# Patient Record
Sex: MALE | URBAN - METROPOLITAN AREA
[De-identification: names, ages, dates, MRNs, and addresses within clinical notes are randomized per-mention and may not be internally consistent; named-entity substitution may affect disease eponyms.]

---

## 2018-09-26 ENCOUNTER — ED HISTORICAL/CONVERTED ENCOUNTER (OUTPATIENT)
Dept: OTHER | Age: 48
End: 2018-09-26

## 2018-09-30 ENCOUNTER — HOSPITAL ENCOUNTER (INPATIENT)
Age: 48
LOS: 1 days | Discharge: HOME | DRG: 864 | End: 2018-10-01
Attending: EMERGENCY MEDICINE | Admitting: HOSPITALIST
Payer: COMMERCIAL

## 2018-09-30 DIAGNOSIS — A01.00 TYPHOID FEVER: Primary | ICD-10-CM

## 2018-09-30 PROBLEM — R50.9 FEVER: Status: ACTIVE | Noted: 2018-09-30

## 2018-09-30 LAB
ALBUMIN SERPL-MCNC: 3.2 G/DL (ref 3.5–5)
ALBUMIN/GLOB SERPL: 0.9 {RATIO} (ref 1.1–2.2)
ALP SERPL-CCNC: 83 U/L (ref 45–117)
ALT SERPL-CCNC: 29 U/L (ref 12–78)
ANION GAP SERPL CALC-SCNC: 9 MMOL/L (ref 5–15)
AST SERPL-CCNC: 26 U/L (ref 15–37)
BASOPHILS # BLD: 0 K/UL (ref 0–0.1)
BASOPHILS NFR BLD: 0 % (ref 0–1)
BILIRUB SERPL-MCNC: 0.6 MG/DL (ref 0.2–1)
BUN SERPL-MCNC: 13 MG/DL (ref 6–20)
BUN/CREAT SERPL: 13 (ref 12–20)
CALCIUM SERPL-MCNC: 8.5 MG/DL (ref 8.5–10.1)
CHLORIDE SERPL-SCNC: 104 MMOL/L (ref 97–108)
CO2 SERPL-SCNC: 27 MMOL/L (ref 21–32)
COMMENT, HOLDF: NORMAL
CREAT SERPL-MCNC: 1.01 MG/DL (ref 0.7–1.3)
CRP SERPL-MCNC: 2.89 MG/DL (ref 0–0.6)
DIFFERENTIAL METHOD BLD: ABNORMAL
EOSINOPHIL # BLD: 0.2 K/UL (ref 0–0.4)
EOSINOPHIL NFR BLD: 3 % (ref 0–7)
ERYTHROCYTE [DISTWIDTH] IN BLOOD BY AUTOMATED COUNT: 13.2 % (ref 11.5–14.5)
GLOBULIN SER CALC-MCNC: 3.5 G/DL (ref 2–4)
GLUCOSE SERPL-MCNC: 122 MG/DL (ref 65–100)
HCT VFR BLD AUTO: 39.9 % (ref 36.6–50.3)
HGB BLD-MCNC: 13.5 G/DL (ref 12.1–17)
IMM GRANULOCYTES # BLD: 0 K/UL (ref 0–0.04)
IMM GRANULOCYTES NFR BLD AUTO: 1 % (ref 0–0.5)
LACTATE SERPL-SCNC: 1.6 MMOL/L (ref 0.4–2)
LDH SERPL L TO P-CCNC: 221 U/L (ref 85–241)
LYMPHOCYTES # BLD: 1.1 K/UL (ref 0.8–3.5)
LYMPHOCYTES NFR BLD: 14 % (ref 12–49)
MCH RBC QN AUTO: 28.8 PG (ref 26–34)
MCHC RBC AUTO-ENTMCNC: 33.8 G/DL (ref 30–36.5)
MCV RBC AUTO: 85.1 FL (ref 80–99)
MONOCYTES # BLD: 1.1 K/UL (ref 0–1)
MONOCYTES NFR BLD: 15 % (ref 5–13)
NEUTS SEG # BLD: 5.2 K/UL (ref 1.8–8)
NEUTS SEG NFR BLD: 68 % (ref 32–75)
NRBC # BLD: 0 K/UL (ref 0–0.01)
NRBC BLD-RTO: 0 PER 100 WBC
PLATELET # BLD AUTO: 150 K/UL (ref 150–400)
PMV BLD AUTO: 10 FL (ref 8.9–12.9)
POTASSIUM SERPL-SCNC: 4.3 MMOL/L (ref 3.5–5.1)
PROT SERPL-MCNC: 6.7 G/DL (ref 6.4–8.2)
RBC # BLD AUTO: 4.69 M/UL (ref 4.1–5.7)
SAMPLES BEING HELD,HOLD: NORMAL
SODIUM SERPL-SCNC: 140 MMOL/L (ref 136–145)
WBC # BLD AUTO: 7.7 K/UL (ref 4.1–11.1)

## 2018-09-30 PROCEDURE — 74011250637 HC RX REV CODE- 250/637: Performed by: HOSPITALIST

## 2018-09-30 PROCEDURE — 80053 COMPREHEN METABOLIC PANEL: CPT | Performed by: EMERGENCY MEDICINE

## 2018-09-30 PROCEDURE — 74011000258 HC RX REV CODE- 258: Performed by: HOSPITALIST

## 2018-09-30 PROCEDURE — 65270000029 HC RM PRIVATE

## 2018-09-30 PROCEDURE — 99283 EMERGENCY DEPT VISIT LOW MDM: CPT

## 2018-09-30 PROCEDURE — 85025 COMPLETE CBC W/AUTO DIFF WBC: CPT | Performed by: EMERGENCY MEDICINE

## 2018-09-30 PROCEDURE — 87040 BLOOD CULTURE FOR BACTERIA: CPT | Performed by: EMERGENCY MEDICINE

## 2018-09-30 PROCEDURE — 83615 LACTATE (LD) (LDH) ENZYME: CPT | Performed by: HOSPITALIST

## 2018-09-30 PROCEDURE — 74011250636 HC RX REV CODE- 250/636: Performed by: HOSPITALIST

## 2018-09-30 PROCEDURE — 83605 ASSAY OF LACTIC ACID: CPT | Performed by: EMERGENCY MEDICINE

## 2018-09-30 PROCEDURE — 36415 COLL VENOUS BLD VENIPUNCTURE: CPT | Performed by: EMERGENCY MEDICINE

## 2018-09-30 PROCEDURE — 86140 C-REACTIVE PROTEIN: CPT | Performed by: HOSPITALIST

## 2018-09-30 RX ORDER — SODIUM CHLORIDE 9 MG/ML
50 INJECTION, SOLUTION INTRAVENOUS CONTINUOUS
Status: DISCONTINUED | OUTPATIENT
Start: 2018-09-30 | End: 2018-10-01 | Stop reason: HOSPADM

## 2018-09-30 RX ORDER — SODIUM CHLORIDE 0.9 % (FLUSH) 0.9 %
5-10 SYRINGE (ML) INJECTION EVERY 8 HOURS
Status: DISCONTINUED | OUTPATIENT
Start: 2018-09-30 | End: 2018-10-01 | Stop reason: HOSPADM

## 2018-09-30 RX ORDER — SODIUM CHLORIDE 0.9 % (FLUSH) 0.9 %
5-10 SYRINGE (ML) INJECTION AS NEEDED
Status: DISCONTINUED | OUTPATIENT
Start: 2018-09-30 | End: 2018-10-01 | Stop reason: HOSPADM

## 2018-09-30 RX ORDER — ACETAMINOPHEN 325 MG/1
650 TABLET ORAL
Status: DISCONTINUED | OUTPATIENT
Start: 2018-09-30 | End: 2018-10-01 | Stop reason: HOSPADM

## 2018-09-30 RX ORDER — ENOXAPARIN SODIUM 100 MG/ML
40 INJECTION SUBCUTANEOUS EVERY 24 HOURS
Status: DISCONTINUED | OUTPATIENT
Start: 2018-09-30 | End: 2018-10-01 | Stop reason: HOSPADM

## 2018-09-30 RX ADMIN — Medication 10 ML: at 18:41

## 2018-09-30 RX ADMIN — SODIUM CHLORIDE 75 ML/HR: 900 INJECTION, SOLUTION INTRAVENOUS at 18:40

## 2018-09-30 RX ADMIN — MEROPENEM 500 MG: 500 INJECTION, POWDER, FOR SOLUTION INTRAVENOUS at 18:40

## 2018-09-30 RX ADMIN — ACETAMINOPHEN 650 MG: 325 TABLET ORAL at 17:56

## 2018-09-30 RX ADMIN — MEROPENEM 500 MG: 500 INJECTION, POWDER, FOR SOLUTION INTRAVENOUS at 23:16

## 2018-09-30 NOTE — H&P
[address]  HISTORY AND PHYSICAL Ashleigh Mata 
MR#: [id number] : 1970 ACCOUNT #: [de-identified] ADMIT DATE: 2018 PRIMARY CARE PHYSICIAN:  Dr. Bk Nguyen. INFECTIOUS DISEASE DOCTOR:  Dr. Henry Dejesus.   
 
PRESENTING COMPLAINT:  Abdominal pain and fever. 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 The patient initially thought that this is due to hypoglycemia, as once he eats his symptoms get better. However, his glycohemoglobin is around 6.1. He went to [place] for some volunteer work in 2018. When he came back his symptoms persisted. The patient initially thought that he had a urinary tract infection so he took Cipro. However, his symptoms did not resolve. Dr. Michelle Reed then had CT angiogram to rule out coronary artery disease because he was having some chest pains as well. According to the patient, his CTA was unremarkable, except it showed some hilar lymphadenopathy this was done before he visited [place]. Patient was having  feeling of weakness and fatigue, so he was seen at [facility] on Wednesday where a CT scan of the chest was done which showed mediastinal and hilar adenopathy with small right supraclavicular and paratracheal lymphadenopathy. Patient's imaging was reviewed by a pulmonologist and radiologist.   Patient's blood work done by his primary care physician showed positive antibody to typhoid. It is not clear if this is IgM or IgG. His blood cultures Per Dr Henry Dejesus , however, were negative which were drawn on Wednesday. Dr. Henry Dejesus gave him 1 dose of Rocephin on last Wednesday and discharged him on Cipro; however, since he visited [place] it was thought that he should be started on Suprax instead of Cipro due to resistance, which was not covered by his insurance agency. Patient took Cipro 750 b.i.d. on Wednesday, Thursday, Friday. Yesterday, he started taking Suprax. However, his abdominal pain and low-grade fever has not resolved, so he decided to come to the emergency room. Patient denies having any other complaints at this time. His abdominal pain is mild and is mainly in the right lower quadrant. A C-reactive protein which was done on Wednesday was 21.4. He denies any vomiting, sweating or rash. He has no cough or significant shortness of breath. PAST MEDICAL HISTORY:  Not significant. SOCIAL HISTORY:  The patient does not smoke. He does not drink. He is . He is an interventional cardiologist. 
 
FAMILY HISTORY:  Significant for multiple family members having heart problems. Coronary artery disease runs in brother, mother, father. Diabetes also runs in the family. Mother had lung and breast cancer. CODE STATUS:  FULL CODE. CURRENT MEDICATIONS:  At this time include Suprax 400 mg p.o. b.i.d., which he started yesterday. Prior to that, he received Cipro and 1 dose of Rocephin. REVIEW OF SYSTEMS:  Negative except as mentioned in history of presenting illness. All other systems were reviewed and no other positive finding was noticed. PHYSICAL EXAMINATION: 
GENERAL:  The patient is a pleasant 78-year-old gentleman, not in any acute distress. VITAL SIGNS:  Reveal a temperature of 100, blood pressure 126/78, pulse is 99, respiratory rate 16, sats 95%. HEENT:  Reveals pupils equally reacting to light and accommodation. NECK:  Supple. There is no lymphadenopathy or JVD. CHEST:  Clear. No wheezing, no crackles. HEART:  S1 and S2 regular. No murmur, no S3. 
ABDOMEN:  Unremarkable. There is mild tenderness in the right lower quadrant with no guarding or rigidity. Bowel sounds are active. EXTREMITIES:  No pedal edema. CENTRAL NERVOUS SYSTEM:  Unremarkable. The patient is awake, alert, oriented. There is normal strength, normal reflexes. Plantars downgoing.   Cranial nerves are normal. 
PSYCHIATRIC: Unremarkable. SKIN:  Does not show any rashes. LABORATORY DATA:  Which was done on the 26th of this month revealed a white count of 6.67, hemoglobin of 13.9, hematocrit 40.4, MCV of 82.3, platelet count was 390,187. His labs on the 26th showed electrolytes:  Sodium 137, potassium 3.9, chloride 101, bicarbonate is 29, BUN 17, creatinine 0.85, glucose was 161, calcium 9.1, albumin 2.9, ALT 21, AST 29, bilirubin 0.9, calcium 9.2. Lactic acid 2.3. Procalcitonin was less than 0.05. CRP high sensitivity was 29.4. Blood cultures were unremarkable. His glycohemoglobin was 6.5. Sent out test for typhoid, showed antibodies to salmonella typhi H type D and O type D. Cross reactivity with other salmonella species cannot be excluded. His PSA was 0.5. Brucella was negative, both IgG and IgM. Todays lab shows 4.1-11.1 (K/uL) 7.7     
  NRBC 0 ( WBC) 0.0    
  RBC RBC 4.10-5.70 (M/uL) 4.69    
  HEMOGLOBIN 12.1-17.0 (g/dL) 13.5    
  HEMATOCRIT 36.6-50.3 (%) 39.9    
  MCV MCV 80.0-99.0 (FL) 85.1    
  MCH MCH 26.0-34.0 (PG) 28.8    
  MCHC MCHC 30.0-36.5 (g/dL) 33.8    
  RDW RDW 11.5-14.5 (%) 13.2    
  PLATELET COUNT 992-127 (K/uL) 150    
  MPV 8.9-12.9 (FL) 10.0    
  SEGS 32-75 (%) 68    
  LYMPHS 12-49 (%) 14    
  MONOS 5-13 (%) 15 (High)    
  EOSINS 0-7 (%) 3    
  BASOS 0-1 (%) 0    
  IMMATURE GRANULOCYTES IG 0.0-0.5 (%) 1 (High)    
  DF  ( ) AUTOMATED    
  ABSOLUTE NRBC 0.00-0.01 (K/uL) 0.00    
  ABSOLUTE NEUTROPHILS ABG 1.8-8.0 (K/UL) 5.2    
  ABS. IMM. GRANS. AIG 0.00-0.04 (K/UL) 0.0    
  ABSOLUTE LYMPHS ABL 0.8-3.5 (K/UL) 1.1    
  ABSOLUTE MONOS ABM 0.0-1.0 (K/UL) 1.1 (High)    
  ABSOLUTE EOSINS RIGOBERTO 0.0-0.4 (K/UL) 0.2    
  ABSOLUTE BASOS ABB 0.0-0.1 (K/UL) 0.0 Results for Red Frias (MRN [id number]) as of 9/30/2018 18:52 Ref. Range 9/30/2018 16:09 Sodium Latest Ref Range: 136 - 145 mmol/L 140 Potassium Latest Ref Range: 3.5 - 5.1 mmol/L 4.3 Chloride Latest Ref Range: 97 - 108 mmol/L 104 CO2 Latest Ref Range: 21 - 32 mmol/L 27 Anion gap Latest Ref Range: 5 - 15 mmol/L 9 Glucose Latest Ref Range: 65 - 100 mg/dL 122 (H) BUN Latest Ref Range: 6 - 20 MG/DL 13 Creatinine Latest Ref Range: 0.70 - 1.30 MG/DL 1.01  
BUN/Creatinine ratio Latest Ref Range: 12 - 20   13 Calcium Latest Ref Range: 8.5 - 10.1 MG/DL 8.5  
GFR est non-AA Latest Ref Range: >60 ml/min/1.73m2 >60  
GFR est AA Latest Ref Range: >60 ml/min/1.73m2 >60 Bilirubin, total Latest Ref Range: 0.2 - 1.0 MG/DL 0.6 Protein, total Latest Ref Range: 6.4 - 8.2 g/dL 6.7 Albumin Latest Ref Range: 3.5 - 5.0 g/dL 3.2 (L) Globulin Latest Ref Range: 2.0 - 4.0 g/dL 3.5 A-G Ratio Latest Ref Range: 1.1 - 2.2   0.9 (L) ALT (SGPT) Latest Ref Range: 12 - 78 U/L 29 AST Latest Ref Range: 15 - 37 U/L 26 Alk. phosphatase Latest Ref Range: 45 - 117 U/L 83 Patient also had a recent echo done at his own office which was negative for endocarditis. Imaging done on 26th at [facility] included a CT scan of the chest, abdomen and pelvis. It showed bilateral mediastinal hilar, subcarinal paraesophageal lymphadenopathy. The largest lymph node is subcarinal 2.3 x 1.4 cm. The largest right hilar lymph node is 2.5 x 1 cm. A superior right paratracheal/supraclavicular lymph node 1.6 x 1 cm was also seen. No destructive blastic or lytic lesions were seen. CT abdomen was also done which showed a fatty liver, hepatic calcification and no stones, but it did show upper abdominal lymphadenopathy around celiac axis. The largest lymph node is 2.7 x 1.5 cm with precaval lymph node enlargement as well, 1.4 x 2.2 cm and 2.0 x 1.6 cm. No ascites or free air was seen. ASSESSMENT AND PLAN:  T 
 
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   It is of note that the patient returned from [place] in  the first week of August.  However, even before going to [facility], he was having these symptoms of weakness, dizziness and lightheadedness. 1.  Rule out lymphoma night sweats with diffuse LN enlargement is suggestive of Lymphoma. Patient  will need  lymph node biopsy. We will consult pulmonology. He will get CD of CT scan done at [facility] on 26th which will be reviewed by IR tomorrow. 2.  Positive antibodies for salmonella. It is not clear if this is IgM or IgG. However, since the patient has returned recently from [facility] will treat him empirically with meropenem as per ID guidelines,  there is high prevalence of resistance to typhoid in [place]. We will start him on meropenem. I  will ask ID to see the patient for further evaluation. I have ordered 2 large volume of blood cultures as mentioned above. I spoke with Dr. Paola Reyes who had seen him at [facility]. She tells me that the blood cultures are negative. Stool cultures for Typhoid are ordered as well. Over all clinical picture is not very suggestive for Typhoid. 3.  Strong history of coronary artery disease. However, his chest pain is atypical and he had negative workup including an echo and CT angiogram. 
4.  Further workup depending on evaluation. 5.  DVT prophylaxis. 6. Quantiferon ordered as he had previous history of BCG related positive PPD. MD RASHAWN Matute/KEISHA 
D: 09/30/2018 16:36    
T: 09/30/2018 18:29 
JOB #: [id number]

## 2018-09-30 NOTE — ED NOTES
TRANSFER - OUT REPORT:    Verbal report given to KASSY Blank (name) on Chrystal Subramanian  being transferred to NSTU (unit) for routine progression of care       Report consisted of patients Situation, Background, Assessment and   Recommendations(SBAR). Information from the following report(s) SBAR and ED Summary was reviewed with the receiving nurse. Lines:   Peripheral IV 09/30/18 Right Forearm (Active)   Site Assessment Clean, dry, & intact 9/30/2018  4:32 PM   Phlebitis Assessment 0 9/30/2018  4:32 PM   Infiltration Assessment 0 9/30/2018  4:32 PM   Dressing Status Clean, dry, & intact 9/30/2018  4:32 PM        Opportunity for questions and clarification was provided.       Patient transported with:   [facility]

## 2018-09-30 NOTE — PROGRESS NOTES
Day #1 of meropenum  Indication:  h/o typhoid fever, bloodstream infection  Current regimen:  1gmq8h  Abx regimen:    Recent Labs      18   1609   WBC  7.7   CREA  1.01   BUN  13     Est CrCl: 93 ml/min  Temp (24hrs), Av.4 °F (37.4 °C), Min:98.8 °F (37.1 °C), Max:100 °F (37.8 °C)    Cultures: blood - pending    Plan: Change to 500mg q6h

## 2018-09-30 NOTE — PROGRESS NOTES
Primary Nurse Emerson Pinto and TOÑO Chapman RN performed a dual skin assessment on this patient No impairment noted  Jhony score is 23

## 2018-09-30 NOTE — ED TRIAGE NOTES
Pt reports he was diagnosed with Typhoid Fever 4 weeks ago and was prescribed antibiotics. Pt continued to feel sick with fever and was seen at [facility] 2 weeks ago and given different antibiotic. Pt states he had traveled to [facility] 6 weeks ago and has been sick since. Temp in triage 100 oral.  Pt took Tylenol 500 mg PO at 0800 this morning.

## 2018-09-30 NOTE — ED PROVIDER NOTES
HPI Comments: 46 y.o. male with no known past medical history who presents from home via a private vehicle with chief complaint of fevers. Pt reports he traveled to [place] and returned six weeks ago with fevers and chills. Pt reports he was diagnosed with Typhoid fever at the time and was prescribed oral Cipro. However, pt states his providers found that the particular strain of typhoid is resistant to Cipro and they changed his antibiotics to Suprax. However, pt reports his fevers never completely resolved and was told he required a PICC line for 10-14 days to receive Ceftriaxone. Pt reports he had a fever of 100.2 F about two hours ago. Pt states he has been trying Ibuprofen 200 mg at home for temporary mild relief. Pt also complains of some mild right lower quadrant abdominal pain. Pt notes he also has some chronic lower extremity edema due to his history of venous insufficiency. Pt states he had an echocardiogram done two days ago which did not show any mass or endocarditis. Pt denies any rash. There are no other acute medical concerns at this time. Note written by Theresa Turner, as dictated by Julián Padilla MD 3:55 PM.        The history is provided by the patient. No  was used. No past medical history on file. No past surgical history on file. No family history on file. Social History     Social History    Marital status:      Spouse name: N/A    Number of children: N/A    Years of education: N/A     Occupational History    Not on file. Social History Main Topics    Smoking status: Not on file    Smokeless tobacco: Not on file    Alcohol use Not on file    Drug use: Not on file    Sexual activity: Not on file     Other Topics Concern    Not on file     Social History Narrative         ALLERGIES: Review of patient's allergies indicates not on file. Review of Systems   Constitutional: Positive for chills and fever.    HENT: Negative for ear pain and sore throat. Eyes: Negative for pain. Respiratory: Negative for chest tightness and shortness of breath. Cardiovascular: Positive for leg swelling (Chronic). Negative for chest pain. Gastrointestinal: Positive for abdominal pain. Negative for nausea and vomiting. Genitourinary: Negative for dysuria and flank pain. Musculoskeletal: Negative for back pain. Skin: Negative for rash. Neurological: Negative for headaches. All other systems reviewed and are negative. Vitals:    09/30/18 1518   BP: 126/78   Pulse: 99   Resp: 16   Temp: 100 °F (37.8 °C)   SpO2: 95%   Weight: 83.9 kg (185 lb)   Height: 5' 11\" (1.803 m)            Physical Exam   Constitutional: He is oriented to person, place, and time. He appears well-developed and well-nourished. No distress. HENT:   Head: Normocephalic and atraumatic. Eyes: Conjunctivae are normal.   Neck: Neck supple. No tracheal deviation present. Cardiovascular: Normal rate, regular rhythm, normal heart sounds and intact distal pulses. Pulmonary/Chest: Effort normal and breath sounds normal. No respiratory distress. Abdominal: He exhibits no distension. There is no tenderness. Musculoskeletal: Normal range of motion. He exhibits edema. 1+ bilateral lower extremity edema. Neurological: He is alert and oriented to person, place, and time. Skin: Skin is warm and dry. Psychiatric: He has a normal mood and affect. Nursing note and vitals reviewed. Note written by Theresa Alonso, as dictated by Emile Turner MD 4:01 PM.    MDM    46 y.o. male presents with known typhoid infection. Referred here for IV ABx and PICC placement. Hospitalist at bedside to admit patient for inpatient management. Stable throughout ED course. ED Course       Procedures    CONSULT NOTE:  4:00 PM Emile Turner MD spoke with Dr. Chaya Mejia, Consult for Hospitalist.  Discussed available diagnostic tests and clinical findings. Dr. Antonia Garcia will see and admit.    4:00 PM  Patient is being admitted to the hospital.  The results of their tests and reasons for their admission have been discussed with them and/or available family. They convey agreement and understanding for the need to be admitted and for their admission diagnosis. Consultation will be made now with the inpatient physician for hospitalization.

## 2018-09-30 NOTE — IP AVS SNAPSHOT
[facility] [facility] 
[phone] Patient: Suzanne Mack MRN: [id number] :1970 You are allergic to the following Allergen Reactions Sulfa (Sulfonamide Antibiotics) Rash Recent Documentation Height Weight BMI  
  
  
 1.803 m 83.9 kg 25.8 kg/m2 Unresulted Labs-Please follow up with your PCP about these lab tests Order Current Status COXIELLA ABS, IGG In process CULTURE, STOOL In process FUNGAL ABS, QT In process HISTOPLASMA AG, UR In process HIV 1/2 AG/AB, 4TH GENERATION,W RFLX CONFIRM In process PARASITE EXAM, BLOOD In process QUANTIFERON-TB PLUS(CLIENT INCUB.) In process CULTURE, BLOOD, PAIRED Preliminary result Emergency Contacts  (Rel.) Home Phone Work Phone Mobile Phone Julissa Mode (Spouse) -- -- [phone] About your hospitalization You were admitted on:  2018 You last received care in the:  [facility] You were discharged on:  2018 Why you were hospitalized Your primary diagnosis was:  Fever Providers Seen During Your Hospitalization Provider Specialty Primary office phone Mike Santizo MD Emergency Medicine [phone] Cris Rodgers MD Internal Medicine [phone] [address] [facility] MD Darshana Internal Medicine [phone] Your Primary Care Physician (PCP) Primary Care Physician Office Phone Office Fax OTHER, PHYS ** None ** ** None ** Follow-up Information Follow up With Details Comments Contact Info Mariya Garcia MD   [address] [facility] Internal Medicine [facility] [address] 
[phone] Aaron Hernandez MD   Patient can only remember the practice name and not the physician My Medications Notice You have not been prescribed any medications. Discharge Instructions Discharge Instructions PATIENT ID: [employer] Marilynn MRN: [id number] YOB: 1970 DATE OF ADMISSION: 9/30/2018  3:39 PM   
DATE OF DISCHARGE: 10/1/2018 PRIMARY CARE PROVIDER: Hermelinda Katz MD  
 
ATTENDING PHYSICIAN: Francy Cunningham MD 
DISCHARGING PROVIDER: Francy Cunningham MD   
To contact this individual call [phone] and ask the  to page. If unavailable ask to be transferred the Adult Hospitalist Department. DISCHARGE DIAGNOSES Subacute febrile illness of undetermined etiology. After discussing with Infectious Disease it was determined you be monitored as out patient off antibiotics. Severeal serology studies were sent prior to your discharge. Follow with Dr Latisha Poole office for results. Keep record of your temperature and take it to your doctors' appointment. Do not take antipyretics unless you have recorded fever. CONSULTATIONS: IP CONSULT TO HOSPITALIST 
IP CONSULT TO INTERVENTIONAL RADIOLOGY 
IP CONSULT TO INFECTIOUS DISEASES 
IP CONSULT TO PULMONOLOGY PROCEDURES/SURGERIES: * No surgery found * PENDING TEST RESULTS:  
At the time of discharge the following test results are still pending: several serology studies are pending at discharge. FOLLOW UP APPOINTMENTS:  
Follow-up Information Follow up With Details Comments Contact Info Liv De Luna MD   [address] [place] [address] [address] [facility] Internal Medicine [address] [place] [address] 
[phone] ADDITIONAL CARE RECOMMENDATIONS:  
 
DIET: Regular Diet DISCHARGE MEDICATIONS: 
 See Medication Reconciliation Form · It is important that you take the medication exactly as they are prescribed. · Keep your medication in the bottles provided by the pharmacist and keep a list of the medication names, dosages, and times to be taken in your wallet. · Do not take other medications without consulting your doctor.   
 
 
NOTIFY YOUR PHYSICIAN FOR ANY OF THE FOLLOWING:  
Fever over 101 degrees for 24 hours. Chest pain, shortness of breath, fever, chills, nausea, vomiting, diarrhea, change in mentation, falling, weakness, bleeding. Severe pain or pain not relieved by medications. Or, any other signs or symptoms that you may have questions about. DISPOSITION: 
x  Home With: 
 OT  PT  [place]  RN  
  
 SNF/Inpatient Rehab/LTAC Independent/assisted living Hospice Other:  
 
 
 
Signed: Justo Cheung MD 
10/1/2018 
7:02 PM 
 
Discharge Orders None [employer] Announcement We are excited to announce that we are making your provider's discharge notes available to you in JoopLoop. You will see these notes when they are completed and signed by the physician that discharged you from your recent hospital stay. If you have any questions or concerns about any information you see in JoopLoop, please call the Health Information Department where you were seen or reach out to your Primary Care Provider for more information about your plan of care. Introducing [facility]! [facility] introduces JoopLoop patient portal. Now you can access parts of your medical record, email your doctor's office, and request medication refills online. 1. In your internet browser, go to [email or web address] 2. Click on the First Time User? Click Here link in the Sign In box. You will see the New Member Sign Up page. 3. Enter your JoopLoop Access Code exactly as it appears below. You will not need to use this code after youve completed the sign-up process. If you do not sign up before the expiration date, you must request a new code. · JoopLoop Access Code: [email or web address] Expires: 12/29/2018  3:13 PM 
 
4. Enter the last four digits of your Social Security Number (xxxx) and Date of Birth (mm/dd/yyyy) as indicated and click Submit. You will be taken to the next sign-up page. 5. Create a JoopLoop ID.  This will be your JoopLoop login ID and cannot be changed, so think of one that is secure and easy to remember. 6. Create a [employer] password. You can change your password at any time. 7. Enter your Password Reset Question and Answer. This can be used at a later time if you forget your password. 8. Enter your e-mail address. You will receive e-mail notification when new information is available in [address] 9. Click Sign Up. You can now view and download portions of your medical record. 10. Click the Download Summary menu link to download a portable copy of your medical information. If you have questions, please visit the Frequently Asked Questions section of the [employer]oris website. Remember, Adventoris is NOT to be used for urgent needs. For medical emergencies, dial 911. Now available from your iPhone and Android! General Information Please provide this summary of care documentation to your next provider. Patient Signature:  ____________________________________________________________ Date:  ____________________________________________________________  
  
[facility] Provider Signature:  ____________________________________________________________ Date:  ____________________________________________________________

## 2018-10-01 VITALS
HEART RATE: 80 BPM | DIASTOLIC BLOOD PRESSURE: 85 MMHG | RESPIRATION RATE: 16 BRPM | OXYGEN SATURATION: 97 % | TEMPERATURE: 99 F | HEIGHT: 71 IN | SYSTOLIC BLOOD PRESSURE: 137 MMHG | BODY MASS INDEX: 25.9 KG/M2 | WEIGHT: 185 LBS

## 2018-10-01 LAB
HIV 1+2 AB+HIV1 P24 AG SERPL QL IA: NONREACTIVE
HIV12 RESULT COMMENT, HHIVC: NORMAL
MALARIA SMEAR BLD: NEGATIVE

## 2018-10-01 PROCEDURE — 74011250636 HC RX REV CODE- 250/636: Performed by: HOSPITALIST

## 2018-10-01 PROCEDURE — 87207 SMEAR SPECIAL STAIN: CPT | Performed by: INTERNAL MEDICINE

## 2018-10-01 PROCEDURE — 36415 COLL VENOUS BLD VENIPUNCTURE: CPT | Performed by: INTERNAL MEDICINE

## 2018-10-01 PROCEDURE — 87385 HISTOPLASMA CAPSUL AG IA: CPT | Performed by: INTERNAL MEDICINE

## 2018-10-01 PROCEDURE — 86638 Q FEVER ANTIBODY: CPT | Performed by: INTERNAL MEDICINE

## 2018-10-01 PROCEDURE — 86480 TB TEST CELL IMMUN MEASURE: CPT | Performed by: HOSPITALIST

## 2018-10-01 PROCEDURE — 87389 HIV-1 AG W/HIV-1&-2 AB AG IA: CPT | Performed by: INTERNAL MEDICINE

## 2018-10-01 PROCEDURE — 86606 ASPERGILLUS ANTIBODY: CPT | Performed by: INTERNAL MEDICINE

## 2018-10-01 PROCEDURE — 74011000258 HC RX REV CODE- 258: Performed by: HOSPITALIST

## 2018-10-01 PROCEDURE — 87045 FECES CULTURE AEROBIC BACT: CPT | Performed by: HOSPITALIST

## 2018-10-01 RX ADMIN — SODIUM CHLORIDE 75 ML/HR: 900 INJECTION, SOLUTION INTRAVENOUS at 05:01

## 2018-10-01 RX ADMIN — Medication 10 ML: at 05:01

## 2018-10-01 RX ADMIN — MEROPENEM 500 MG: 500 INJECTION, POWDER, FOR SOLUTION INTRAVENOUS at 18:05

## 2018-10-01 RX ADMIN — MEROPENEM 500 MG: 500 INJECTION, POWDER, FOR SOLUTION INTRAVENOUS at 05:01

## 2018-10-01 RX ADMIN — MEROPENEM 500 MG: 500 INJECTION, POWDER, FOR SOLUTION INTRAVENOUS at 11:55

## 2018-10-01 RX ADMIN — ENOXAPARIN SODIUM 40 MG: 40 INJECTION SUBCUTANEOUS at 18:04

## 2018-10-01 NOTE — PROGRESS NOTES
1813: Patient is requesting to go with antibiotics and does not want to spend another night. Talked to Dr. Malone Romberg who has contacted ID with the discharge process.

## 2018-10-01 NOTE — CONSULTS
PULMONARY/CRITICAL CARE/SLEEP MEDICINE    Initial Physician Consultation Note    Name: Rafa Moreno   : 1970   MRN: [id number]   Date: 10/1/2018      Subjective:   Consult Note: 10/1/2018   Requesting Physician: Dr. Donell Raymond  Reason for consult: Abnormal chest imaging    Medical records and data reviewed. Patient is a 46 y.o. male who presented to the hospital for abd pain and fever. He was noted to have lab abnormalities suggestive of salmonella infection and is being treated for the same. He reports a history of fatigue and tiredness for the past year not associated with fever, night sweats of weight loss. 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 He has a history of positive PPD. Prior to leaving for [place] this summer, he had coronary CT that suggested mediastinal adenopathy. This was confirmed by a chest CT at the OSH. CT images are not with the patient and have been sent down to be scanned in PACS-- not available for review in PACS at this hour as well. He was recommended mediastinal LN biopsy by pulmonologist at the OSH and wants to consolidate care here. Mediastinal adenopathy apparently predates his travel to [place]    Review of Systems:     A comprehensive 12 system review of systems was negative except for as documented in HPI    Assessment:     Suspected Typhoid fever  Abnormal chest imaging with mediastinal LAD, possibly component of generalized LAD, no images to review personally at this hour  H/O positive PPD    Recommendations:    We will review chest imaging when it becomes available and explore EBUS option if lymph nodes are amenable to biopsy by that route  This can be done as an outpatient as well if discharge is planned-- probably preferable after acute illness has resolved  Dr. Khari Chaudhary to follow up with plans tomorrow  D/W patient    Active Problem List:     Problem List  Date Reviewed: 2018          Codes Class    * (Principal)Fever ICD-10-CM: R50.9  ICD-9-CM: 780.60               Past Medical History:      has no past medical history on file. Past Surgical History:      has no past surgical history on file. Home Medications:     Prior to Admission medications    Not on File       Allergies/Social/Family History: Allergies   Allergen Reactions    Sulfa (Sulfonamide Antibiotics) Rash      Social History   Substance Use Topics    Smoking status: Not on file    Smokeless tobacco: Not on file    Alcohol use Not on file      No family history on file. Objective:   Vital Signs:  Visit Vitals    /67 (BP 1 Location: Left arm, BP Patient Position: At rest)    Pulse 76    Temp 98.5 °F (36.9 °C)    Resp 16    Ht 5' 11\" (1.803 m)    Wt 83.9 kg (185 lb)    SpO2 98%    BMI 25.8 kg/m2      O2 Device: Room air Temp (24hrs), Av °F (37.2 °C), Min:98.5 °F (36.9 °C), Max:100 °F (37.8 °C)           Intake/Output:     Intake/Output Summary (Last 24 hours) at 10/01/18 1205  Last data filed at 10/01/18 1054   Gross per 24 hour   Intake             1465 ml   Output                0 ml   Net             1465 ml       Physical Exam:   General:  Alert, cooperative, no distress, appears stated age. Head:  Normocephalic, without obvious abnormality, atraumatic. Eyes:  Conjunctivae/corneas clear. PERRL   Neck: Supple, symmetrical, no adenopathy, no carotid bruit and no JVD. Lungs:   Clear to auscultation bilaterally. Chest wall:  No tenderness or deformity. Heart:  Regular rate and rhythm, S1-S2 normal, no murmur, no click, rub or gallop. Abdomen:   Soft, non-tender. Bowel sounds present. No masses,  No organomegaly. Extremities: Atraumatic, no cyanosis or edema.    Pulses: Palpable   Skin: No rashes or lesions   Neurologic: Grossly nonfocal         LABS AND  DATA: Personally reviewed  Recent Labs      18   1609   WBC  7.7   HGB  13.5   HCT  39.9   PLT  150     Recent Labs      18   1609   NA 140   K  4.3   CL  104   CO2  27   BUN  13   CREA  1.01   GLU  122*   CA  8.5     Recent Labs      09/30/18   1609   SGOT  26   AP  83   TP  6.7   ALB  3.2*   GLOB  3.5     No results for input(s): INR, PTP, APTT in the last 72 hours. No lab exists for component: INREXT   No results for input(s): PHI, PCO2I, PO2I, FIO2I in the last 72 hours. No results for input(s): CPK, CKMB, TROIQ, BNPP in the last 72 hours. MEDS: Reviewed    Chest Imaging: personally reviewed and report checked    Tele- reviewed    Medical decision making:   I have reviewed the flowsheet and previous day's notes  Review and order of Clinical lab tests        Thank you for allowing me to participate in this patient's care.     Shayy Briceno MD      [facility]

## 2018-10-01 NOTE — PROGRESS NOTES
CM received call from Andreina Murillo, [facility] / Juancarlos Sanchez regarding this patient. He has [facility] and [facility] [place] is not a participating provider. She states she has notified Dr. Rajinder Isidro of the patient's insurance status and recommendation for transfer to [facility]. CM is available to assist with transfer arrangements if indicated.   Claire Schlatter, [place], CRM

## 2018-10-01 NOTE — PROGRESS NOTES
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 The patient initially thought that this is due to hypoglycemia, as once he eats his symptoms get better. However, his glycohemoglobin is around 6.1. He went to [place] for some volunteer work in 2018. When he came back his symptoms persisted. The patient initially thought that he had a urinary tract infection so he took Cipro. However, his symptoms did not resolve. Dr. Jabier Givens then had CT angiogram to rule out coronary artery disease because he was having some chest pains as well. According to the patient, his CTA was unremarkable, except it showed some hilar lymphadenopathy this was done before he visited [place]. Patient was having  feeling of weakness and fatigue, so he was seen at [facility] on Wednesday where a CT scan of the chest was done which showed mediastinal and hilar adenopathy with small right supraclavicular and paratracheal lymphadenopathy. Patient's imaging was reviewed by a pulmonologist and radiologist.   Patient's blood work done by his primary care physician showed positive antibody to typhoid. It is not clear if this is IgM or IgG. His blood cultures Per Dr Abhishek Wu , however, were negative which were drawn on Wednesday.   Dr. Abhishek Wu gave him 1 dose of Rocephin on last Wednesday and discharged him on Cipro; however, since he visited [place] it was thought that he should be started on Suprax instead of Cipro due to resistance, which was not covered by his insurance agency. Patient took Cipro 750 b.i.d. on Wednesday, Thursday, Friday. Yesterday, he started taking Suprax. However, his abdominal pain and low-grade fever has not resolved, so he decided to come to the emergency room. Patient denies having any other complaints at this time. His abdominal pain is mild and is mainly in the right lower quadrant. A C-reactive protein which was done on Wednesday was 21.4. He denies any vomiting, sweating or rash. He has no cough or significant shortness of breath. 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 No fever or abdominal pain. I have talked with ID,and his PCP. Assessment & Plan:   Subacute febrile illness,unclear etiology. .  I understand typhoid fever diagnosis was entertained by his outpatient providers including infectious disease based on recent travel ,positive antibody. He has taken round os antibiotics including ciprofloxacin. He started Suprax two days ago. There was plan for picc placement for IV ceftriaxone. Chest,abdomena and pelvic CT reports indicated LAP,no image and report in our system. I was informed by patients brother in law, [facility] that  [facility] [place] at [id number] looked at the CT LN have a reactive appearance and recommended deferring biopsy. I discussed with Dr Dontrell Schwarz who is gathering data and additional information from his out patient providers to make recommendations. CRP slightly elevated,ESR normal.WBC essentially normal.Had peripheral smear which was negative for malaria. Patient reported he had two negative echocardiograms for IE. Quantiferon result pending.  -continue Merrem for now. No antipyretics unless fever is documented,communicated to patient and nurse. Generalized lymphadenopathy  -While this could be reactive of the febrile illness,he may need biopsy diagnosis to rule out ddx such as lymphoma,Tb and other inflammatory conditions. -pulmonary consulted.       Strong history of coronary artery disease. However, his chest pain is atypical and he had negative workup including an echo and CT angiogram.    Regular diet  Ambulate  Possible d/c in AM.    Hospital Problems  Date Reviewed: 9/30/2018          Codes Class Noted POA    * (Principal)Fever ICD-10-CM: R50.9  ICD-9-CM: 780.60  9/30/2018 Unknown                Review of Systems:   A comprehensive review of systems was negative except for that written in the HPI. Physical Examination:      Last 24hrs VS reviewed since prior progress note. Most recent are:  Visit Vitals    /85 (BP 1 Location: Left arm, BP Patient Position: At rest)    Pulse 80    Temp 99 °F (37.2 °C)    Resp 16    Ht 5' 11\" (1.803 m)    Wt 83.9 kg (185 lb)    SpO2 97%    BMI 25.8 kg/m2           Constitutional:  No acute distress, cooperative, pleasant    HEENT: Head is a traumatic,  Un icteric sclera. Pink conjunctiva,no erythema or discharge. Oral mucous moist, oropharynx benign. Neck supple,    Resp:  CTA bilaterally. No wheezing/rhonchi/rales. No accessory muscle use   CV:  Regular rhythm, normal rate, no murmurs, gallops, rubs    GI:  Soft, non distended, non tender. normoactive bowel sounds, no hepatosplenomegaly    :  No CVA or suprapubic tenderness   Skin  :  No erythema,rash,bullae,dipigmentation     Musculoskeletal:  No edema, warm, 2+ pulses throughout    Neurologic:  AAOx3, CN II-XII reviewed. Moves all extremities. Psych:  Good insight, Not anxious nor agitated.        Intake/Output Summary (Last 24 hours) at 10/01/18 1725  Last data filed at 10/01/18 1054   Gross per 24 hour   Intake             1465 ml   Output                0 ml   Net             1465 ml          Data Review:    Review and/or order of clinical lab test  Review and/or order of tests in the radiology section of CPT  Review and/or order of tests in the medicine section of CPT      Labs:     Recent Labs      09/30/18   1609   WBC  7.7   HGB  13.5   HCT  39.9   PLT  150     Recent Labs 09/30/18   1609   NA  140   K  4.3   CL  104   CO2  27   BUN  13   CREA  1.01   GLU  122*   CA  8.5     Recent Labs      09/30/18   1609   SGOT  26   ALT  29   AP  83   TBILI  0.6   TP  6.7   ALB  3.2*   GLOB  3.5     No results for input(s): INR, PTP, APTT in the last 72 hours. No lab exists for component: INREXT   No results for input(s): FE, TIBC, PSAT, FERR in the last 72 hours. No results found for: FOL, RBCF   No results for input(s): PH, PCO2, PO2 in the last 72 hours. No results for input(s): CPK, CKNDX, TROIQ in the last 72 hours.     No lab exists for component: CPKMB  No results found for: CHOL, CHOLX, CHLST, CHOLV, HDL, LDL, LDLC, DLDLP, TGLX, TRIGL, TRIGP, CHHD, CHHDX  No results found for: GLUCPOC  No results found for: COLOR, APPRN, SPGRU, REFSG, MARIBEL, PROTU, GLUCU, KETU, BILU, UROU, SOHA, LEUKU, GLUKE, EPSU, BACTU, WBCU, RBCU, CASTS, UCRY      Medications Reviewed:     Current Facility-Administered Medications   Medication Dose Route Frequency    sodium chloride (NS) flush 5-10 mL  5-10 mL IntraVENous Q8H    sodium chloride (NS) flush 5-10 mL  5-10 mL IntraVENous PRN    enoxaparin (LOVENOX) injection 40 mg  40 mg SubCUTAneous Q24H    meropenem (MERREM) 500 mg in 0.9% sodium chloride (MBP/ADV) 50 mL  500 mg IntraVENous Q6H    acetaminophen (TYLENOL) tablet 650 mg  650 mg Oral Q6H PRN    0.9% sodium chloride infusion  50 mL/hr IntraVENous CONTINUOUS     ______________________________________________________________________  EXPECTED LENGTH OF STAY: 2d 21h  ACTUAL LENGTH OF STAY:          1                 Kimberly Torres MD

## 2018-10-01 NOTE — CONSULTS
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 He does not take any travel prophylaxis  LAst December( )   he was planning to go to [facility] but had to return from the airport because of fever, cough and took zpak and felt better. In 2018 he started to feel tired and exhausted and was also getting vague chest pains- so he had a heart CT with angio in 2018 which ruled out coronary artery disease. It showed some small mediastinal, carinal, hilar lymph nodes. He went to [place] In 2018 for medical work to  [address] [place] [address] and saw patients upto 350 OP and also did surgeries. When he got back after 10 days he was tired and had low grade fevers < 100  In aug 2018 he had some dysuria and lower abdominal pain  and thought he had UTI and took cipro for 5 days 250mg BID. He then went to his PCP on  and he had labs sent including brucella, rickettsia, malaria, blood culture which were all negative. He sent typhoid antibodies which came back positive ( O and H)  On  patient had a long first case at work and had to cancel the rest of the patients due to exhaustion.  He had fever as high as 102 and went to [address] [place] [address] on  to the ED and had labs drawn and got another CT chest. Saw an ID physician who questioned typhoid and  gave him IV ceftriaxone 1 dose and sent him on cipro which was changed to suprax ( because of concern for resistance)  Pt took suprax for 3 days and on  as he did not look well his DANIEL a physician with [facility] brought him to the Ed and he was admitted- he has not had any fever since admission. Prior to admission he has been taking Ibuprofen and tylenol almost every day.   He never had diarrhea, cough, sob, rash, joint pain, weight loss, poor appetite  He never took chemo prophylaxis for malaria or had any vaccination when he visited [place]  He drank bottled water and did not eat any street food or raw food  He did not do any hiking, swimming, water sports, exposure to animals, insect bites, animal bites etc  A few months ago he had a broken took and had root canal rx  His mother has been sick and in the hospital for the past 4 months in [place] and he has been busy visiting her as well      PMH-none  PSH-none  SOCX- lives with wife and kids  Non smoker, no alcohol or illicit drugs  FAMHX  Mother breast cancer, lung cancer  DAD hepatitis B/C due to blood transfusion    ALLERGY -sulfa  Medications  suprax  Cipro        REVIEW OF SYSTEMS:     Const:   fever,  chills, negative weight loss  Eyes:   negative diplopia or visual changes, negative eye pain  ENT:   negative coryza, negative sore throat  Resp:   negative cough, hemoptysis, dyspnea  Cards:  negative for chest pain, palpitations, some lower extremity edema  :  negative for frequency, some dysuria no  hematuria  Skin:   negative for rash and pruritus  Heme:  negative for easy bruising and gum/nose bleeding  MS:   myalgias,  muscle weakness  Neurolo:  negative for headaches, dizziness, vertigo, memory problems   Psych:  negative for feelings of anxiety, depression       Objective:   VITALS:    Visit Vitals    /85 (BP 1 Location: Left arm, BP Patient Position: At rest)    Pulse 80    Temp 99 °F (37.2 °C)    Resp 16    Ht 5' 11\" (1.803 m)    Wt 185 lb (83.9 kg)    SpO2 97%    BMI 25.8 kg/m2     Temp (24hrs), Av.8 °F (37.1 °C), Min:98.5 °F (36.9 °C), Max:99.3 °F (37.4 °C)    PHYSICAL EXAM:   General:    Alert, cooperative, no distress, appears stated age. Head:   Normocephalic, without obvious abnormality, atraumatic. Eyes:   Conjunctivae clear, anicteric sclerae. Pupils are equal  Nose:  Nares normal. No drainage or sinus tenderness. Throat:    Lips, mucosa, and tongue normal.  No Thrush  Neck:  Supple, symmetrical,  no adenopathy, thyroid: non tender    no carotid bruit and no JVD. Back:    No CVA tenderness. Lungs:   Clear to auscultation bilaterally. No Wheezing or Rhonchi. No rales. Heart:   Regular rate and rhythm,  no murmur, rub or gallop. Abdomen:   Soft, non-tender,not distended. Bowel sounds normal. No masses  Extremities: Extremities normal, atraumatic, no cyanosis. No edema. No clubbing  Skin:     No rashes or lesions.   Not Jaundiced  Lymph: Cervical, supraclavicular normal.  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 of 1 week duration  Fatigue , exhaustion for 6 months  Low grade fever for > 2-3 months  FUO like presentation  Pt is not a crisp historian     Does his presentation has an overarching diagnosis or is this 2 separate events  He does seem to have an underlying sub acute event with an acute febrile illness  With travel in July most of the illnesses like Typhoid /viral infections are well beyond the incubation period of 21 days    Unlikely this is enteric fever and the typhoid antibodies only indicate past exposure and is not the test to diagnose acute illness- blood cultures have been negative but the first one was taken after he took cipro    TB is of concern especially with lymphadenopathy, h/o positive PPD and sub acute presentation    MAlaria is still a possibility    Sub acute infective endocarditis a concern as well- says he had 2 d echo X 2 and it is normal    Other infections like Brucella, coxiella, histoplasma in the D.D    Pt would like to go home today  Discussed with him that IV ceftriaxone as home infusion is not needed- rather would not give any antibiotics as the picture is not clear and as he is stable will observe without antibiotics- meanwhile he will keep a temp log  Will send some labs today including HIV, histo, malaria parasite, coxiella and will decide on further testing including Lymph node biopsy as OP  Discussed with  in [facility] him the option to follow with me as OP      Discussed with patient and his Brother in law a physciian in great detail  Discussed with

## 2018-10-01 NOTE — DISCHARGE SUMMARY
Discharge Summary       PATIENT ID: Eleno Hernandez  MRN: [id number]   YOB: 1970    DATE OF ADMISSION: 2018  3:39 PM    DATE OF DISCHARGE: 10/1/2018  PRIMARY CARE PROVIDER: Hermelinda Katz MD     ATTENDING PHYSICIAN: Francy Cunningham MD  DISCHARGING PROVIDER: Francy Cunningham MD    To contact this individual call [phone] and ask the  to page. If unavailable ask to be transferred the Adult Hospitalist Department. 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 The patient initially thought that this is due to hypoglycemia, as once he eats his symptoms get better. However, his glycohemoglobin is around 6.1. He went to [place] for some volunteer work in 2018. When he came back his symptoms persisted. The patient initially thought that he had a urinary tract infection so he took Cipro. However, his symptoms did not resolve. Dr. Manas Stephen then had CT angiogram to rule out coronary artery disease because he was having some chest pains as well. According to the patient, his CTA was unremarkable, except it showed some hilar lymphadenopathy this was done before he visited [place]. Patient was having  feeling of weakness and fatigue, so he was seen at [facility] on Wednesday where a CT scan of the chest was done which showed mediastinal and hilar adenopathy with small right supraclavicular and paratracheal lymphadenopathy.   Patient's imaging was reviewed by a pulmonologist and radiologist.   Patient's blood work done by his primary care physician showed positive antibody to typhoid. It is not clear if this is IgM or IgG. His blood cultures Per Dr Semaj Santoyo , however, were negative which were drawn on Wednesday. Dr. Semaj Santoyo gave him 1 dose of Rocephin on last Wednesday and discharged him on Cipro; however, since he visited [place] it was thought that he should be started on Suprax instead of Cipro due to resistance, which was not covered by his insurance agency. Patient took Cipro 750 b.i.d. on Wednesday, Thursday, Friday. Yesterday, he started taking Suprax. However, his abdominal pain and low-grade fever has not resolved, so he decided to come to the emergency room. Patient denies having any other complaints at this time. His abdominal pain is mild and is mainly in the right lower quadrant. A C-reactive protein which was done on Wednesday was 21.4. He denies any vomiting, sweating or rash. He has no cough or significant shortness of breath. 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 No fever or abdominal pain. I have talked with ID,and his PCP. Assessment & Plan:   Subacute febrile illness,unclear etiology. .  I understand typhoid fever diagnosis was entertained by his outpatient providers including infectious disease based on recent travel ,positive antibody. He has taken round os antibiotics including ciprofloxacin. He started Suprax two days ago. There was plan for picc placement for IV ceftriaxone. Chest,abdomena and pelvic CT reports indicated LAP,no image and report in our system. I was informed by patients brother in law,Dr Dora Clifford that Dr Keagan Meyer over at [facility] looked at the CT LN have a reactive appearance and recommended deferring biopsy. CRP slightly elevated,ESR normal.WBC essentially normal.Had peripheral smear which was negative for malaria. Patient reported he had two negative echocardiograms for IE. Quantiferon result pending.     After several discussions with ID and patient,it was decided he be discharged off antibiotics and be followed patient. Generalized lymphadenopathy  -While this could be reactive of the febrile illness,he needs out patient follow up. Strong history of coronary artery disease. However, his chest pain is atypical and he had negative workup including an echo and CT angiogram.                 PENDING TEST RESULTS:   At the time of discharge the following test results are still pending: none    FOLLOW UP APPOINTMENTS:    Follow-up Information     Follow up With Details Comments Contact Info    Segundo Fisher MD   [address] Internal Medicine   [address]  [phone]             ADDITIONAL CARE RECOMMENDATIONS:     DIET: Regular Diet    ACTIVITY: Activity as tolerated    WOUND CARE: NA    EQUIPMENT needed: NA      DISCHARGE MEDICATIONS:  There are no discharge medications for this patient. NOTIFY YOUR PHYSICIAN FOR ANY OF THE FOLLOWING:   Fever over 101 degrees for 24 hours. Chest pain, shortness of breath, fever, chills, nausea, vomiting, diarrhea, change in mentation, falling, weakness, bleeding. Severe pain or pain not relieved by medications. Or, any other signs or symptoms that you may have questions about.     DISPOSITION:  x  Home With:   OT  PT  HH  RN       Long term SNF/Inpatient Rehab    Independent/assisted living    Hospice    Other:       PATIENT CONDITION AT DISCHARGE:     Functional status    Poor     Deconditioned    x Independent      Cognition   x  Lucid     Forgetful     Dementia      Catheters/lines (plus indication)    Kendall     PICC     PEG    x None      Code status   x  Full code     DNR      PHYSICAL EXAMINATION AT DISCHARGE:     Visit Vitals    /85 (BP 1 Location: Left arm, BP Patient Position: At rest)    Pulse 80    Temp 99 °F (37.2 °C)    Resp 16    Ht 5' 11\" (1.803 m)    Wt 83.9 kg (185 lb)    SpO2 97%    BMI 25.8 kg/m2      O2 Device: Room air    Temp (24hrs), Av.8 °F (37.1 °C), Min:98.5 °F (36.9 °C), Max:99.3 °F (37.4 °C)        09/30 0701 - 10/01 1900  In: 3246 [P.O.:600; I.V.:865]  Out: -     GENERAL:  Alert, oriented, cooperative, no apparent distress  HEENT:  Normocephalic, atraumatic, non icteric sclerae, non pallor conjuctivae, EOMs intact, PERRLA. NECK: Supple, trachea midline, no adenopathy, no thyromegally or tenderness, no carotid bruit and no JVD. LUNGS:   Vesicular breath sounds bilaterally, no added sounds. HEART:   S1 and S2 well heard,RRR,  no murmur, click, rub or gallop. ABDOMEB:   Soft, non-tender. Normoactive bowel sounds. No masses,  No organomegaly. EXTREMETIES:  Atraumatic, acyanotic, no edema  PULSES: 2+ and symmetric all extremities. SKIN:  No rashes or lesions  NEUROLOGY: Alert and oriented to PPT, CNII-XII intact. Motor and sensory exam grossly intact. CHRONIC MEDICAL DIAGNOSES:  Problem List as of 10/1/2018  Date Reviewed: 9/30/2018          Codes Class Noted - Resolved    * (Principal)Fever ICD-10-CM: R50.9  ICD-9-CM: 780.60  9/30/2018 - Present              Greater than 35 minutes were spent with the patient on counseling and coordination of care    Signed:    Colin Lee MD  10/1/2018  7:06 PM

## 2018-10-01 NOTE — DISCHARGE INSTRUCTIONS
Discharge Instructions       PATIENT ID: Nereida Doe  MRN: [id number]   YOB: 1970    DATE OF ADMISSION: 9/30/2018  3:39 PM    DATE OF DISCHARGE: 10/1/2018    PRIMARY CARE PROVIDER: Shahbaz Hdez MD     ATTENDING PHYSICIAN: Carley Mckeon MD  DISCHARGING PROVIDER: Carley Mckeon MD    To contact this individual call [phone] and ask the  to page. If unavailable ask to be transferred the Adult Hospitalist Department. DISCHARGE DIAGNOSES   Subacute febrile illness of undetermined etiology. After discussing with Infectious Disease it was determined you be monitored as out patient off antibiotics. Severeal serology studies were sent prior to your discharge. Follow with Dr Mercy Mendoza office for results. Keep record of your temperature and take it to your doctors' appointment. Do not take antipyretics unless you have recorded fever. CONSULTATIONS: IP CONSULT TO HOSPITALIST  IP CONSULT TO INTERVENTIONAL RADIOLOGY  IP CONSULT TO INFECTIOUS DISEASES  IP CONSULT TO PULMONOLOGY    PROCEDURES/SURGERIES: * No surgery found *    PENDING TEST RESULTS:   At the time of discharge the following test results are still pending: several serology studies are pending at discharge. FOLLOW UP APPOINTMENTS:   Follow-up Information     Follow up With Details Comments Contact Info    Mayte Lord MD   [address] [place] [address] [address] [place] [address],[facility] Floor  [phone]             ADDITIONAL CARE RECOMMENDATIONS:     DIET: Regular Diet        DISCHARGE MEDICATIONS:   See Medication Reconciliation Form    · It is important that you take the medication exactly as they are prescribed. · Keep your medication in the bottles provided by the pharmacist and keep a list of the medication names, dosages, and times to be taken in your wallet. · Do not take other medications without consulting your doctor.        NOTIFY YOUR PHYSICIAN FOR ANY OF THE FOLLOWING:   Fever over 101 degrees for 24 hours. Chest pain, shortness of breath, fever, chills, nausea, vomiting, diarrhea, change in mentation, falling, weakness, bleeding. Severe pain or pain not relieved by medications. Or, any other signs or symptoms that you may have questions about. DISPOSITION:  x  Home With:   OT  PT  HH  RN       SNF/Inpatient Rehab/LTAC    Independent/assisted living    Hospice    Other:         Signed:    Tim Alexandra MD  10/1/2018  7:02 PM

## 2018-10-01 NOTE — PROGRESS NOTES
Problem: General Medical Care Plan  Goal: *Fluid volume balance  Outcome: Not Progressing Towards Goal  Fluid rate decreased today d/t 2+ pitting edema in lower legs.

## 2018-10-01 NOTE — PROGRESS NOTES
Bedside and Verbal shift change report given to Quintin Partida (oncoming nurse) by Jennifer Walter (offgoing nurse). Report included the following information SBAR.

## 2018-10-01 NOTE — PROGRESS NOTES
Bedside shift change report given to Rosanne Cogan (oncoming nurse) by [facility] (offgoing nurse). Report included the following information SBAR.

## 2018-10-03 LAB
BACTERIA SPEC CULT: NORMAL
C BURNET PH1 IGG SER-ACNC: NEGATIVE
C BURNET PH2 IGG SER-ACNC: NEGATIVE
C JEJUNI+C COLI AG STL QL: NEGATIVE
DISCLAIMER:, 130261: NORMAL
E COLI SXT1+2 STL IA: NEGATIVE
HISTOPLASMA AG, UR,HAGU: <0.5
SERVICE CMNT-IMP: NORMAL

## 2018-10-04 ENCOUNTER — DOCUMENTATION ONLY (OUTPATIENT)
Dept: INTERNAL MEDICINE CLINIC | Age: 48
End: 2018-10-04

## 2018-10-04 NOTE — PROGRESS NOTES
Called  to check on him after discharge- He has had no fever.  Not taking any antipyretic or NSAID  Gave him the results of lab work which were all negative

## 2018-10-05 LAB
A FLAVUS AB SER QL ID: NEGATIVE
A FUMIGATUS AB SER QL ID: NEGATIVE
A NIGER AB SER QL ID: NEGATIVE
B DERMAT AB TITR SER: NEGATIVE {TITER}
BACTERIA SPEC CULT: NORMAL
C IMMITIS AB TITR SER ID: NORMAL {TITER}
H CAPSUL AB TITR SER ID: NEGATIVE {TITER}
SERVICE CMNT-IMP: NORMAL

## 2018-10-09 LAB
QUANTIFERON CRITERIA, QFI1T: ABNORMAL
QUANTIFERON MITOGEN VALUE: >10 IU/ML
QUANTIFERON NIL VALUE: 0.27 IU/ML
QUANTIFERON PLUS, QFI6T: POSITIVE
QUANTIFERON TB1 AG: 1.71 IU/ML
QUANTIFERON TB2 AG: 1.51 IU/ML

## 2020-03-24 ENCOUNTER — NURSE TRIAGE (OUTPATIENT)
Dept: OTHER | Facility: CLINIC | Age: 50
End: 2020-03-24

## 2020-03-25 ENCOUNTER — APPOINTMENT (OUTPATIENT)
Dept: GENERAL RADIOLOGY | Age: 50
End: 2020-03-25
Attending: STUDENT IN AN ORGANIZED HEALTH CARE EDUCATION/TRAINING PROGRAM
Payer: COMMERCIAL

## 2020-03-25 ENCOUNTER — HOSPITAL ENCOUNTER (EMERGENCY)
Age: 50
Discharge: HOME | End: 2020-03-25
Attending: STUDENT IN AN ORGANIZED HEALTH CARE EDUCATION/TRAINING PROGRAM
Payer: COMMERCIAL

## 2020-03-25 VITALS
SYSTOLIC BLOOD PRESSURE: 111 MMHG | RESPIRATION RATE: 18 BRPM | OXYGEN SATURATION: 94 % | TEMPERATURE: 98.2 F | DIASTOLIC BLOOD PRESSURE: 67 MMHG | HEART RATE: 88 BPM

## 2020-03-25 DIAGNOSIS — J22 LOWER RESPIRATORY TRACT INFECTION: Primary | ICD-10-CM

## 2020-03-25 LAB
FLUAV AG NPH QL IA: NEGATIVE
FLUBV AG NOSE QL IA: NEGATIVE

## 2020-03-25 PROCEDURE — 71045 X-RAY EXAM CHEST 1 VIEW: CPT

## 2020-03-25 PROCEDURE — 99283 EMERGENCY DEPT VISIT LOW MDM: CPT

## 2020-03-25 PROCEDURE — 87804 INFLUENZA ASSAY W/OPTIC: CPT

## 2020-03-25 NOTE — TELEPHONE ENCOUNTER
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 Symptoms: Cough - occasional sputum, fever - 100.6, feels hot. Diabetic. Recommendation is to see a physician in next 24 hours. Provided information with regards to the Flu Clinic and Testing sites at [facility]. Dr. Porter Perea will call [facility] direct to check on COVID testing and if times for ED testing. Call back with worsening symptoms.

## 2020-03-25 NOTE — ED TRIAGE NOTES
Saw a patient Thursday who has since tested positive for Covid. He himself started to have symptoms yesteray of fever, cough, and today headache, fatigue and shortness of breath. He is masked in triage.

## 2020-03-25 NOTE — ED PROVIDER NOTES
Maddiedarrell Lopez is a 44 y.o. male with past medical history notable for sarcoidosis, diabetes presenting with cough, fever, tmax 100.6, cough, mild dyspnea. Cough started 2 days ago, fever started today. No recent travel. 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 No lower extremity edema, pleurisy, hematemesis, hemoptysis, chest discomfort. Past Medical History:   Diagnosis Date    Diabetes ([facility] [address]     Sarcoidosis        Past Surgical History:   Procedure Laterality Date    HX LYMPH NODE DISSECTION           No family history on file.     Social History     Socioeconomic History    Marital status:      Spouse name: Not on file    Number of children: Not on file    Years of education: Not on file    Highest education level: Not on file   Occupational History    Not on file   Social Needs    Financial resource strain: Not on file    Food insecurity     Worry: Not on file     Inability: Not on file    Transportation needs     Medical: Not on file     Non-medical: Not on file   Tobacco Use    Smoking status: Never Smoker   Substance and Sexual Activity    Alcohol use: Not on file    Drug use: Not on file    Sexual activity: Not on file   Lifestyle    Physical activity     Days per week: Not on file     Minutes per session: Not on file    Stress: Not on file   Relationships    Social connections     Talks on phone: Not on file     Gets together: Not on file     Attends Voodoo service: Not on file     Active member of club or organization: Not on file     Attends meetings of clubs or organizations: Not on file     Relationship status: Not on file    Intimate partner violence     Fear of current or ex partner: Not on file     Emotionally abused: Not on file     Physically abused: Not on file     Forced sexual activity: Not on file Other Topics Concern    Not on file   Social History Narrative    Not on file         ALLERGIES: Sulfa (sulfonamide antibiotics)    Review of Systems   Constitutional: Positive for fatigue and fever. Negative for chills. HENT: Negative for ear pain, sore throat and trouble swallowing. Eyes: Negative for visual disturbance. Respiratory: Positive for cough. Negative for shortness of breath. Cardiovascular: Negative for chest pain and leg swelling. Gastrointestinal: Negative for abdominal pain. Genitourinary: Negative for dysuria. Musculoskeletal: Negative for back pain. Skin: Negative for rash. Neurological: Negative for light-headedness and headaches. Psychiatric/Behavioral: Negative for confusion. All other systems reviewed and are negative. Vitals:    03/25/20 0036   BP: 131/84   Pulse: 96   Resp: 18   Temp: 98.4 °F (36.9 °C)   SpO2: 95%            Physical Exam  Vitals signs reviewed. Constitutional:       General: He is not in acute distress. HENT:      Head: Normocephalic and atraumatic. Mouth/Throat:      Mouth: Mucous membranes are moist.      Pharynx: Oropharynx is clear. Cardiovascular:      Comments: 2+ radial pulses  Pulmonary:      Effort: Pulmonary effort is normal. Tachypnea present. Abdominal:      Tenderness: There is no abdominal tenderness. There is no guarding or rebound. Musculoskeletal: Normal range of motion. Right lower leg: No edema. Left lower leg: No edema. Skin:     General: Skin is warm and dry. Capillary Refill: Capillary refill takes less than 2 seconds. Neurological:      General: No focal deficit present. Mental Status: He is alert and oriented to person, place, and time.    Psychiatric:         Mood and Affect: Mood normal.          MDM  Number of Diagnoses or Management Options  Lower respiratory tract infection:   Diagnosis management comments: Patient with possible COVID-19 infection, had a contact in his office, patient is a healthcare provider. Discussed with infection prevention nurse, she discussed the case with the Department of [facility] who determined that he does not need in-house [facility] testing criteria and a test kit was sent. X-ray and influenza testing negative.          Procedures

## 2020-03-25 NOTE — DISCHARGE INSTRUCTIONS
Your physician or healthcare provider has determined that you are at risk for the Coronavirus (COVID-19). If you have met CDC criteria, your physician may have sent a laboratory test.  Please adhere to the following restrictions until you obtain your results or are cleared by your primary care doctor:    Stay at home except to get medical care. Seek medical attention if you develop worsening symptoms or new concerns such as severe shortness of breath, chest pain, etc.    Separate yourself from other people and animals in your home. If possible, stay in a separate room and use a separate bathroom from others in your house. Restrict contact with pets, as there is a possibility of transmission of the virus. Call your doctor before showing up at their office. Let them know you have or may have COVID-19    Wear a facemask when you are around other people. Cover your mouth when you cough or sneeze. Wash your hands often with warm soapy water for at least 20 seconds. If soap and water are not available, use an alcohol based hand . Clean all high touch surfaces everyday. For example: counters, tabletops, doorknobs, bathroom fixtures, toilets, phones, keyboards, tablets, and bedside tables. Monitor your symptoms at home. Seek prompt medical attention if you symptoms worsen. (i.e. difficulty breathing). Call your healthcare provider before coming and tell them you may have COVID-19. Wear a mask upon entering the facility. Source: [employer] ([email or web address])      If you have further questions about the Coronavirus [phone]), please contact the [address] [place] [address] at [phone], the [address] [address] [facility] at [phone] or [facility] [phone].

## 2020-03-26 ENCOUNTER — PATIENT OUTREACH (OUTPATIENT)
Dept: FAMILY MEDICINE CLINIC | Age: 50
End: 2020-03-26

## 2020-03-26 NOTE — PROGRESS NOTES
COVID-19 Screening Initial Follow-up Note    Patient contacted regarding TCAMD-80 exposure. Care Transition Nurse/ Ambulatory Care Manager contacted the patient by telephone to perform post discharge assessment. Verified name and  with patient as identifiers. Provided introduction to self, and explanation of the CTN/ACM role, and reason for call due to risk factors for infection and/or exposure to COVID-19. Symptoms reviewed with patient who verbalized the following symptoms:  fever, fatigue, pain or aching joints, cough, shortness of breath, no new/worsening symptoms, states he is feeling better. Awaiting results of testing. Due to no new or worsening symptoms encounter was not routed to provider for escalation. Patient has following risk factors of: none known. CTN/ACM reviewed discharge instructions, medical action plan and red flags such as increased shortness of breath, increasing fever and signs of decompensation with patient who verbalized understanding. Discussed exposure protocols and quarantine with CDC Guidelines What to do if you are sick with coronavirus disease 2019 Patient who was given an opportunity for questions and concerns. The patient agrees to contact the [facility] exposure line [phone], [facility] department [address]  ([phone] and PCP office for questions related to their healthcare. CTN/ACM provided contact information for future reference.     Reviewed and educated patient on any new and changed medications related to discharge diagnosis     Plan for follow-up call in 14 days based on severity of symptoms and risk factors

## 2020-03-30 NOTE — PROGRESS NOTES
Patient Name: Yoel Lainez  Procedure Date: 11/21/2017 12:14 PM  MRN: [id number]  Account Number: [id number]  YOB: 1957  Age: 60  Attending MD: Wilman Dash MD  Grafts or Implants: No  Procedure:            Upper EUS  Indications:          Pancreatic cyst on MRCP, Suspected solid pancreatic                        neoplasm, Suspected cystic pancreatic neoplasm  Patient Profile:      Obstructive jaundice  Providers:            Wilman Dash MD  Referring MD:         Jose Solomon MD, Santiago Goldstein MD, Moustapha Davalos Md  Sedation:             See the Anesthesia note for documentation of the                        administered medications  Procedure:       After obtaining informed consent, the endoscope was passed under direct       vision. Throughout the procedure, the patient's blood pressure, pulse,       and oxygen saturations were monitored continuously. The endosonoscope       was introduced through the mouth, and advanced to the second part of       duodenum. The upper EUS was accomplished without difficulty. The patient       tolerated the procedure well. The endosonoscope was introduced through       the mouth, and advanced to the.  Findings:       Endoscopic Finding :       The examined esophagus was endoscopically normal.       The entire examined stomach was endoscopically normal.       The examined duodenum had edema in the duodenal sweep, and previously       placed internal-external biliary drain.       EUS:       Upper EUS was performed using Olympus radial and linear echoendoscopes.       Within the pancreatic head, a 3cm x 3cm anechoic cyst was identified.       The cystic lesion had mural nodularity.       This cyst was completely aspirated removing 30cc clear fluid to send for       cytology and CEA level using 22G FNA needle.       Once the cyst was aspirated, an associated very ill-defined hypoechoic       solid mass lesion was identifed in this same area  Result reviewed [facility] notified which was initially       difficult to visualize, measuring 34mm x 33mm in the pancreas head.       This was sampled using 22G FNA needle x 5. On-site cytopathologist (Dr. Longoria) confirmed cellular adequacy. The mass lesion did not appear to       involve any major vasculature.  Impression:       - Ill-defined 34mm x 33mm hypoechoic solid mass lesion with cystic       component (with mural nodularity) status post FNA x 5 of the solid       lesion as well as aspiration of the cyst (3cm) to remove 30cc clear       fluid for CEA analysis.       No obvious vascular involvement was noted.  Recommendation:       1) Followup with cytology and cyst fluid results       2) Followup with inpatient service  Complications:        No immediate complications.  Estimated Blood Loss: Estimated blood loss: none.  Wilman Dash MD  11/21/2017 1:38:45 PM  This report has been signed electronically by the above.  Number of Addenda: 0  Scope Withdrawal Time 0 hours 0 minutes 3 seconds

## 2020-03-31 LAB — EMERGENT DISEASE PANEL, EDPR: NOT DETECTED

## 2020-04-09 ENCOUNTER — PATIENT OUTREACH (OUTPATIENT)
Dept: FAMILY MEDICINE CLINIC | Age: 50
End: 2020-04-09

## 2020-04-09 NOTE — PROGRESS NOTES
Patient resolved from Transition of Care episode on 4/9/2020  Patient/family has been provided the following resources and education related to COVID-19:                         Signs, symptoms and red flags related to COVID-19            CDC exposure and quarantine guidelines            [facility] exposure contact - [phone]            Contact for their local Department of Health                 Patient currently reports that the following symptoms have improved:  no new/worsening symptoms     No further outreach scheduled with this CTN/ACM. Episode of Care resolved. Patient has this CTN/ACM contact information if future needs arise.

## 2020-06-16 LAB — SARS-COV-2, NAA: NEGATIVE

## 2020-07-29 ENCOUNTER — OFFICE VISIT (OUTPATIENT)
Dept: ENDOCRINOLOGY | Age: 50
End: 2020-07-29

## 2020-07-29 VITALS
HEART RATE: 94 BPM | RESPIRATION RATE: 18 BRPM | WEIGHT: 187.8 LBS | TEMPERATURE: 97.7 F | BODY MASS INDEX: 26.29 KG/M2 | DIASTOLIC BLOOD PRESSURE: 69 MMHG | SYSTOLIC BLOOD PRESSURE: 113 MMHG | OXYGEN SATURATION: 96 % | HEIGHT: 71 IN

## 2020-07-29 DIAGNOSIS — E55.9 VITAMIN D DEFICIENCY: ICD-10-CM

## 2020-07-29 DIAGNOSIS — R53.82 CHRONIC FATIGUE: ICD-10-CM

## 2020-07-29 DIAGNOSIS — D86.9 SARCOIDOSIS: ICD-10-CM

## 2020-07-29 DIAGNOSIS — E11.29 TYPE II OR UNSPECIFIED TYPE DIABETES MELLITUS WITH RENAL MANIFESTATIONS, UNCONTROLLED(250.42) (HCC): Primary | ICD-10-CM

## 2020-07-29 DIAGNOSIS — E11.65 TYPE II OR UNSPECIFIED TYPE DIABETES MELLITUS WITH RENAL MANIFESTATIONS, UNCONTROLLED(250.42) (HCC): Primary | ICD-10-CM

## 2020-07-29 DIAGNOSIS — E11.29 TYPE II OR UNSPECIFIED TYPE DIABETES MELLITUS WITH RENAL MANIFESTATIONS, UNCONTROLLED(250.42) (HCC): ICD-10-CM

## 2020-07-29 DIAGNOSIS — E78.2 MIXED HYPERLIPIDEMIA: ICD-10-CM

## 2020-07-29 DIAGNOSIS — E03.9 ACQUIRED HYPOTHYROIDISM: ICD-10-CM

## 2020-07-29 DIAGNOSIS — E11.65 TYPE II OR UNSPECIFIED TYPE DIABETES MELLITUS WITH RENAL MANIFESTATIONS, UNCONTROLLED(250.42) (HCC): ICD-10-CM

## 2020-07-29 LAB — HBA1C MFR BLD HPLC: 6.7 %

## 2020-07-29 RX ORDER — METFORMIN HYDROCHLORIDE 500 MG/1
TABLET ORAL
COMMUNITY
End: 2020-07-29 | Stop reason: ALTCHOICE

## 2020-07-29 RX ORDER — PRAVASTATIN SODIUM 40 MG/1
40 TABLET ORAL
COMMUNITY
End: 2021-05-20 | Stop reason: ALTCHOICE

## 2020-07-29 RX ORDER — SITAGLIPTIN AND METFORMIN HYDROCHLORIDE 100; 1000 MG/1; MG/1
TABLET, FILM COATED, EXTENDED RELEASE ORAL
Qty: 90 TAB | Refills: 3 | Status: SHIPPED | OUTPATIENT
Start: 2020-07-29 | End: 2021-05-20 | Stop reason: SDUPTHER

## 2020-07-29 NOTE — PROGRESS NOTES
1. Have you been to the ER, urgent care clinic since your last visit? yes-co-vid testing, July, 2020  Hospitalized since your last visit? No    2. Have you seen or consulted any other health care providers outside of the [address] [place] [address] since your last visit? Include any pap smears or colon screening.  Yes When: Pulmonogist-July, 2020    Wt Readings from Last 3 Encounters:   07/29/20 187 lb 12.8 oz (85.2 kg)     Temp Readings from Last 3 Encounters:   07/29/20 97.7 °F (36.5 °C) (Oral)     BP Readings from Last 3 Encounters:   07/29/20 113/69     Pulse Readings from Last 3 Encounters:   07/29/20 94

## 2020-07-29 NOTE — PROGRESS NOTES
HISTORY OF PRESENT ILLNESS  Chrystal Schuler is a 44 y.o. male. HPI  Initial visit for diabetes  Management     Patient here for initial visit of Type 2 diabetes mellitus . 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 He had mediastinoscopy LN biopsy done which showed sarcoidosis  November 2018       During 2019 he treated himself for possible tuberculosis. His chest CT in April 2019 showed non-enlarged mediastinal lymph nodes PET CT scan did show a right supraclavicular lymph node in the mediastinum and adenopathy in jeff hepatitis and portocaval area and some right thyroid nodule with uptake    He then had other imaging studies including a PET/CT which showed presence of enlarged lymph nodes in the central axis. He had since followed up with the head of sarcoidosis disease at [facility]/pulmonologist.      In the process, he had gotten high-dose steroids which he has taken for 3 months and during that time he was found to have elevated blood sugar/A1c at the primary care physician's office which would fall into January 2020    He got started on metformin. He has been doing his best to minimize the carbohydrates in his diet. He still feels very tired and what he has noticed a significantly is feeling drowsy soon after consumption of carbohydrate meal, and the sugars going over 300 mg      Patient started to notice some atypical chest pains again which led to a cardiac catheterization and it is negative for any blocks. He the EKG showed right bundle branch block.   He underwent studies to rule out cardiac sarcoidosis and he also had MRI of the brain which was normal.      Current A1C is     7.4 %    FROM June 2020   and symptoms/problems include  Fatigue and  Feeling sleepy after eating     Current diabetic medications include  METFORMIN . Current monitoring regimen: home blood tests - rarely  Home blood sugar records: trend: unknown  Any episodes of hypoglycemia? no    Weight trend: stable  Prior visit with dietician: no  Current diet: \"healthy\" diet  in general  Current exercise: no regular exercise, walking    Known diabetic complications: none  Cardiovascular risk factors: dyslipidemia, diabetes mellitus, male gender, stress    Eye exam current (within one year): yes  HARRY: unknown     No past medical history on file. No past surgical history on file. Current Outpatient Medications   Medication Sig    metFORMIN (GLUCOPHAGE) 500 mg tablet Take  by mouth daily (with breakfast).  pravastatin (PravachoL) 40 mg tablet Take 40 mg by mouth nightly. No current facility-administered medications for this visit.               PAST MEDICAL HISTORY  :       Sarcoidisis, 1.5 YEARS AGO   DIABETES -         Social History     Socioeconomic History    Marital status: UNKNOWN     Spouse name: Not on file    Number of children: Not on file    Years of education: Not on file    Highest education level: Not on file   Occupational History    Not on file   Social Needs    Financial resource strain: Not on file    Food insecurity     Worry: Not on file     Inability: Not on file    Transportation needs     Medical: Not on file     Non-medical: Not on file   Tobacco Use    Smoking status: Not on file   Substance and Sexual Activity    Alcohol use: Not on file    Drug use: Not on file    Sexual activity: Not on file   Lifestyle    Physical activity     Days per week: Not on file     Minutes per session: Not on file    Stress: Not on file   Relationships    Social connections     Talks on phone: Not on file     Gets together: Not on file     Attends Christianity service: Not on file     Active member of club or organization: Not on file     Attends meetings of clubs or organizations: Not on file     Relationship status: Not on file    Intimate partner violence     Fear of current or ex partner: Not on file     Emotionally abused: Not on file     Physically abused: Not on file     Forced sexual activity: Not on file   Other Topics Concern    Not on file   Social History Narrative    Not on file       No family history on file. Review of Systems   Constitutional: Positive for malaise/fatigue and weight loss. HENT: Negative. Eyes: Negative. Respiratory: Negative. Cardiovascular: Negative. Gastrointestinal: Negative. Genitourinary: Negative. Musculoskeletal: Negative. Skin: Negative. Neurological: Negative. Endo/Heme/Allergies: Negative. Psychiatric/Behavioral: Negative. Physical Exam  Constitutional:       Appearance: He is well-developed. HENT:      Head: Normocephalic. Eyes:      Conjunctiva/sclera: Conjunctivae normal.      Pupils: Pupils are equal, round, and reactive to light. Neck:      Musculoskeletal: Normal range of motion and neck supple. Cardiovascular:      Rate and Rhythm: Normal rate and regular rhythm. Pulmonary:      Effort: Pulmonary effort is normal.      Breath sounds: Normal breath sounds. Abdominal:      General: Bowel sounds are normal.      Palpations: Abdomen is soft. Musculoskeletal: Normal range of motion. Skin:     General: Skin is warm and dry. Neurological:      Mental Status: He is alert and oriented to person, place, and time. Deep Tendon Reflexes: Reflexes are normal and symmetric.        Obtain records from [facility]  Patient's myocardial perfusion study done on July 23, 2020 showed good ejection fraction of 74% and a small septal wall defect of 8%      RI from date July 24, 2020 of brain with and without contrast: No abnormal findings except for retention cysts in the maxillary sinuses      Reviewed the PET/CT done on July 24, 2020 to exclude cardiac sarcoidosis  Multiple hypermetabolic bilateral supraclavicular nodes seen  Hypermetabolic conglomerate of left paratracheal nodes  Hypermetabolic right paratracheal node  Hypermetabolic subcarinal adenopathy  Hypermetabolic bilateral hilar lymph nodes right greater than the left  Hypermetabolic bilateral axillary lymph nodes  Hypermetabolic paraesophageal and left para-aortic node  Scattered hypermetabolic foci in the left lower lobe  Focal increased uptake in the medial right lobe of the liver adjacent to the gallbladder and one more in the caudate lobe  There are lymph nodes enlarged in the jeff hepatitis and portacaval area noted mild splenomegaly  Also noted hypermetabolic foci in the right scapular spine and in the subcutaneous tissues of the posterior chest  No cardiac sarcoidosis      ASSESSMENT and PLAN    1. Type 2 DM uncontrolled : A1c today by POC is 6. 7% compared to 7. Percent from January 2020    Reviewed the glucose log : no   Discussed pathophysiology of the diabetes  Stopping metformin and will go for Janumet XR    Patient is advised to check blood sugars one time daily by rotation method. reviewed medications and side effects in detail  lab results and schedule of future lab studies reviewed with patient    specific diabetic recommendations: diabetic diet discussed in detail, written exchange diet given, home glucose monitoring emphasized, all medications, side effects and compliance discussed carefully, foot care discussed and Podiatry visits discussed, annual eye examinations at Ophthalmology discussed, glycohemoglobin and other lab monitoring discussed, long term diabetic complications discussed and labs immediately prior to next visit    2. Hypoglycemia :  Educated on treating the hypoglycemia. 3. HTN : Not on meds. Patient is educated about importance of compliance with anti-hypertensives especially ARB/ACEI    4. Dyslipidemia : continue Pravachol 40.    January 2020 labs indicated LDL of 154, triglycerides 241, total cholesterol 65  Labs pending for the lipid profile  Patient is educated about benefits and adverse effects of statins and explained how benefits outweigh risk. 5. use of aspirin to prevent MI and TIA's discussed      6. sarcoidisis  : Will rule out any infection of the pituitary gland hence ordered all the pituitary lab profile  Reviewed the notes from Dr. Dexter Renee ,  From nov 2019   And from July 2020   Patient has intrathoracic and extrathoracic lymphadenopathy biopsy-proven. He plans to take a second opinion from [place] where there is another sarcoidosis speciality clinic    7. Elevated LFTs : Definitely they are better compared to January labs from the PCP      8.   Subclinical hypothyroidism noted on labs from January 2020  Ordering labs      > 50 % of time is spent on counseling   Patient voiced understanding her plan of care

## 2020-08-11 LAB
25(OH)D3+25(OH)D2 SERPL-MCNC: 11.4 NG/ML (ref 30–100)
ALBUMIN SERPL-MCNC: 4.3 G/DL (ref 3.8–4.9)
ALBUMIN/GLOB SERPL: 1.3 {RATIO} (ref 1.2–2.2)
ALP SERPL-CCNC: 208 IU/L (ref 39–117)
ALT SERPL-CCNC: 40 IU/L (ref 0–44)
AST SERPL-CCNC: 38 IU/L (ref 0–40)
BILIRUB SERPL-MCNC: 0.5 MG/DL (ref 0–1.2)
BUN SERPL-MCNC: 17 MG/DL (ref 6–24)
BUN/CREAT SERPL: 20 (ref 9–20)
C PEPTIDE SERPL-MCNC: 7.6 NG/ML (ref 1.1–4.4)
CALCIUM SERPL-MCNC: 9.5 MG/DL (ref 8.7–10.2)
CHLORIDE SERPL-SCNC: 103 MMOL/L (ref 96–106)
CHOLEST SERPL-MCNC: 155 MG/DL (ref 100–199)
CO2 SERPL-SCNC: 23 MMOL/L (ref 20–29)
CREAT SERPL-MCNC: 0.84 MG/DL (ref 0.76–1.27)
FSH SERPL-ACNC: 5.7 MIU/ML (ref 1.5–12.4)
GAD65 AB SER IA-ACNC: <5 U/ML (ref 0–5)
GLOBULIN SER CALC-MCNC: 3.3 G/DL (ref 1.5–4.5)
GLUCOSE SERPL-MCNC: 122 MG/DL (ref 65–99)
HDLC SERPL-MCNC: 29 MG/DL
IGF-I SERPL-MCNC: 90 NG/ML (ref 74–255)
INTERPRETATION, 910389: NORMAL
LDLC SERPL CALC-MCNC: 85 MG/DL (ref 0–99)
LH SERPL-ACNC: 6.1 MIU/ML (ref 1.7–8.6)
Lab: NORMAL
POTASSIUM SERPL-SCNC: 4.3 MMOL/L (ref 3.5–5.2)
PROLACTIN SERPL-MCNC: 6.4 NG/ML (ref 4–15.2)
PROT SERPL-MCNC: 7.6 G/DL (ref 6–8.5)
SODIUM SERPL-SCNC: 143 MMOL/L (ref 134–144)
TESTOST FREE SERPL-MCNC: 3 PG/ML (ref 7.2–24)
TESTOST SERPL-MCNC: 146 NG/DL (ref 264–916)
TRIGL SERPL-MCNC: 207 MG/DL (ref 0–149)
TSH SERPL DL<=0.005 MIU/L-ACNC: 4.95 UIU/ML (ref 0.45–4.5)
VLDLC SERPL CALC-MCNC: 41 MG/DL (ref 5–40)

## 2020-08-24 ENCOUNTER — DOCUMENTATION ONLY (OUTPATIENT)
Dept: ENDOCRINOLOGY | Age: 50
End: 2020-08-24

## 2020-08-24 DIAGNOSIS — E03.9 HYPOTHYROIDISM, UNSPECIFIED TYPE: ICD-10-CM

## 2020-08-24 DIAGNOSIS — E23.0 PITUITARY HYPOGONADISM (HCC): Primary | ICD-10-CM

## 2020-08-24 NOTE — PROGRESS NOTES
Please get the radiologist at [facility] who read his  MRI of brain       Lis Sorensen MD

## 2020-08-26 ENCOUNTER — DOCUMENTATION ONLY (OUTPATIENT)
Dept: ENDOCRINOLOGY | Age: 50
End: 2020-08-26

## 2020-08-26 NOTE — PROGRESS NOTES
I spoke to [id number] radiologist   Dr. Zaira Rehman     MRI of brain -  He did not see any pituitary gland abnormality       Rikki Manriquez MD

## 2020-09-02 RX ORDER — LEVOTHYROXINE SODIUM 25 UG/1
25 TABLET ORAL
Qty: 30 TAB | Refills: 4 | Status: SHIPPED | OUTPATIENT
Start: 2020-09-02 | End: 2020-10-29 | Stop reason: SDUPTHER

## 2020-09-02 RX ORDER — TESTOSTERONE ENANTHATE 50 MG/.5ML
50 INJECTION SUBCUTANEOUS
Qty: 2 ML | Refills: 2 | Status: SHIPPED | OUTPATIENT
Start: 2020-09-02 | End: 2020-09-15 | Stop reason: SDUPTHER

## 2020-09-15 DIAGNOSIS — E23.0 PITUITARY HYPOGONADISM (HCC): ICD-10-CM

## 2020-09-15 DIAGNOSIS — E03.9 HYPOTHYROIDISM, UNSPECIFIED TYPE: ICD-10-CM

## 2020-09-15 RX ORDER — TESTOSTERONE ENANTHATE 50 MG/.5ML
50 INJECTION SUBCUTANEOUS
Qty: 2 ML | Refills: 2 | Status: SHIPPED | OUTPATIENT
Start: 2020-09-15 | End: 2021-05-20 | Stop reason: ALTCHOICE

## 2020-10-29 DIAGNOSIS — E03.9 HYPOTHYROIDISM, UNSPECIFIED TYPE: ICD-10-CM

## 2020-10-29 DIAGNOSIS — E23.0 PITUITARY HYPOGONADISM (HCC): ICD-10-CM

## 2020-10-29 RX ORDER — LEVOTHYROXINE SODIUM 25 UG/1
25 TABLET ORAL
Qty: 90 TAB | Refills: 3 | Status: SHIPPED | OUTPATIENT
Start: 2020-10-29 | End: 2021-05-20 | Stop reason: SDUPTHER

## 2020-12-14 ENCOUNTER — DOCUMENTATION ONLY (OUTPATIENT)
Dept: ENDOCRINOLOGY | Age: 50
End: 2020-12-14

## 2020-12-14 DIAGNOSIS — R53.82 CHRONIC FATIGUE: ICD-10-CM

## 2020-12-14 DIAGNOSIS — E03.9 HYPOTHYROIDISM, UNSPECIFIED TYPE: ICD-10-CM

## 2020-12-14 DIAGNOSIS — E11.65 TYPE II OR UNSPECIFIED TYPE DIABETES MELLITUS WITH RENAL MANIFESTATIONS, UNCONTROLLED(250.42) (HCC): ICD-10-CM

## 2020-12-14 DIAGNOSIS — E55.9 VITAMIN D DEFICIENCY: ICD-10-CM

## 2020-12-14 DIAGNOSIS — E23.0 PITUITARY HYPOGONADISM (HCC): Primary | ICD-10-CM

## 2020-12-14 DIAGNOSIS — E11.29 TYPE II OR UNSPECIFIED TYPE DIABETES MELLITUS WITH RENAL MANIFESTATIONS, UNCONTROLLED(250.42) (HCC): ICD-10-CM

## 2020-12-14 DIAGNOSIS — D86.9 SARCOIDOSIS: ICD-10-CM

## 2020-12-16 ENCOUNTER — DOCUMENTATION ONLY (OUTPATIENT)
Dept: ENDOCRINOLOGY | Age: 50
End: 2020-12-16

## 2020-12-16 DIAGNOSIS — E23.0 PITUITARY HYPOGONADISM (HCC): Primary | ICD-10-CM

## 2020-12-19 LAB
ALBUMIN SERPL-MCNC: 4.3 G/DL (ref 3.8–4.9)
ALBUMIN/CREAT UR: 4 MG/G CREAT (ref 0–29)
ALBUMIN/GLOB SERPL: 1.3 {RATIO} (ref 1.2–2.2)
ALP SERPL-CCNC: 308 IU/L (ref 39–117)
ALT SERPL-CCNC: 87 IU/L (ref 0–44)
AST SERPL-CCNC: 54 IU/L (ref 0–40)
BASOPHILS # BLD AUTO: 0 X10E3/UL (ref 0–0.2)
BASOPHILS NFR BLD AUTO: 1 %
BILIRUB SERPL-MCNC: 0.8 MG/DL (ref 0–1.2)
BUN SERPL-MCNC: 14 MG/DL (ref 6–24)
BUN/CREAT SERPL: 14 (ref 9–20)
CALCIUM SERPL-MCNC: 9.5 MG/DL (ref 8.7–10.2)
CHLORIDE SERPL-SCNC: 101 MMOL/L (ref 96–106)
CHOLEST SERPL-MCNC: 225 MG/DL (ref 100–199)
CO2 SERPL-SCNC: 22 MMOL/L (ref 20–29)
CREAT SERPL-MCNC: 1.01 MG/DL (ref 0.76–1.27)
CREAT UR-MCNC: 240.2 MG/DL
EOSINOPHIL # BLD AUTO: 0.2 X10E3/UL (ref 0–0.4)
EOSINOPHIL NFR BLD AUTO: 4 %
ERYTHROCYTE [DISTWIDTH] IN BLOOD BY AUTOMATED COUNT: 13.9 % (ref 11.6–15.4)
EST. AVERAGE GLUCOSE BLD GHB EST-MCNC: 134 MG/DL
FSH SERPL-ACNC: 5.8 MIU/ML (ref 1.5–12.4)
GLOBULIN SER CALC-MCNC: 3.4 G/DL (ref 1.5–4.5)
GLUCOSE SERPL-MCNC: 118 MG/DL (ref 65–99)
HBA1C MFR BLD: 6.3 % (ref 4.8–5.6)
HCT VFR BLD AUTO: 41.9 % (ref 37.5–51)
HDLC SERPL-MCNC: 35 MG/DL
HGB BLD-MCNC: 13.9 G/DL (ref 13–17.7)
IMM GRANULOCYTES # BLD AUTO: 0 X10E3/UL (ref 0–0.1)
IMM GRANULOCYTES NFR BLD AUTO: 0 %
INTERPRETATION, 910389: NORMAL
LDLC SERPL CALC-MCNC: 156 MG/DL (ref 0–99)
LH SERPL-ACNC: 3.9 MIU/ML (ref 1.7–8.6)
LYMPHOCYTES # BLD AUTO: 0.8 X10E3/UL (ref 0.7–3.1)
LYMPHOCYTES NFR BLD AUTO: 14 %
Lab: NORMAL
MCH RBC QN AUTO: 27.8 PG (ref 26.6–33)
MCHC RBC AUTO-ENTMCNC: 33.2 G/DL (ref 31.5–35.7)
MCV RBC AUTO: 84 FL (ref 79–97)
MICROALBUMIN UR-MCNC: 9.3 UG/ML
MONOCYTES # BLD AUTO: 0.8 X10E3/UL (ref 0.1–0.9)
MONOCYTES NFR BLD AUTO: 13 %
NEUTROPHILS # BLD AUTO: 3.9 X10E3/UL (ref 1.4–7)
NEUTROPHILS NFR BLD AUTO: 68 %
PLATELET # BLD AUTO: 167 X10E3/UL (ref 150–450)
POTASSIUM SERPL-SCNC: 4.7 MMOL/L (ref 3.5–5.2)
PROT SERPL-MCNC: 7.7 G/DL (ref 6–8.5)
PSA SERPL-MCNC: 0.4 NG/ML (ref 0–4)
RBC # BLD AUTO: 5 X10E6/UL (ref 4.14–5.8)
SODIUM SERPL-SCNC: 138 MMOL/L (ref 134–144)
TESTOST FREE SERPL-MCNC: 7 PG/ML (ref 7.2–24)
TESTOST SERPL-MCNC: 211 NG/DL (ref 264–916)
TRIGL SERPL-MCNC: 186 MG/DL (ref 0–149)
TSH SERPL DL<=0.005 MIU/L-ACNC: 3.18 UIU/ML (ref 0.45–4.5)
VLDLC SERPL CALC-MCNC: 34 MG/DL (ref 5–40)
WBC # BLD AUTO: 5.7 X10E3/UL (ref 3.4–10.8)

## 2021-05-16 DIAGNOSIS — E03.9 HYPOTHYROIDISM, UNSPECIFIED TYPE: ICD-10-CM

## 2021-05-16 DIAGNOSIS — D86.9 SARCOIDOSIS: ICD-10-CM

## 2021-05-16 DIAGNOSIS — E78.2 MIXED HYPERLIPIDEMIA: ICD-10-CM

## 2021-05-16 DIAGNOSIS — E23.0 PITUITARY HYPOGONADISM (HCC): Primary | ICD-10-CM

## 2021-05-16 DIAGNOSIS — E11.29 TYPE II OR UNSPECIFIED TYPE DIABETES MELLITUS WITH RENAL MANIFESTATIONS, UNCONTROLLED(250.42) (HCC): ICD-10-CM

## 2021-05-16 DIAGNOSIS — E11.65 TYPE II OR UNSPECIFIED TYPE DIABETES MELLITUS WITH RENAL MANIFESTATIONS, UNCONTROLLED(250.42) (HCC): ICD-10-CM

## 2021-05-16 DIAGNOSIS — E55.9 VITAMIN D DEFICIENCY: ICD-10-CM

## 2021-05-20 ENCOUNTER — OFFICE VISIT (OUTPATIENT)
Dept: ENDOCRINOLOGY | Age: 51
End: 2021-05-20
Payer: COMMERCIAL

## 2021-05-20 VITALS
SYSTOLIC BLOOD PRESSURE: 115 MMHG | WEIGHT: 186 LBS | HEART RATE: 76 BPM | OXYGEN SATURATION: 97 % | BODY MASS INDEX: 25.19 KG/M2 | RESPIRATION RATE: 16 BRPM | DIASTOLIC BLOOD PRESSURE: 63 MMHG | HEIGHT: 72 IN

## 2021-05-20 DIAGNOSIS — E11.29 TYPE II OR UNSPECIFIED TYPE DIABETES MELLITUS WITH RENAL MANIFESTATIONS, UNCONTROLLED(250.42) (HCC): Primary | ICD-10-CM

## 2021-05-20 DIAGNOSIS — E23.0 PITUITARY HYPOGONADISM (HCC): ICD-10-CM

## 2021-05-20 DIAGNOSIS — E03.9 HYPOTHYROIDISM, UNSPECIFIED TYPE: ICD-10-CM

## 2021-05-20 DIAGNOSIS — E11.65 TYPE II OR UNSPECIFIED TYPE DIABETES MELLITUS WITH RENAL MANIFESTATIONS, UNCONTROLLED(250.42) (HCC): Primary | ICD-10-CM

## 2021-05-20 DIAGNOSIS — D86.9 SARCOIDOSIS: ICD-10-CM

## 2021-05-20 LAB — HBA1C MFR BLD HPLC: 6.2 %

## 2021-05-20 PROCEDURE — 99214 OFFICE O/P EST MOD 30 MIN: CPT | Performed by: INTERNAL MEDICINE

## 2021-05-20 PROCEDURE — 83036 HEMOGLOBIN GLYCOSYLATED A1C: CPT | Performed by: INTERNAL MEDICINE

## 2021-05-20 RX ORDER — ICOSAPENT ETHYL 1000 MG/1
1 CAPSULE ORAL 2 TIMES DAILY WITH MEALS
Qty: 180 CAPSULE | Refills: 3 | Status: SHIPPED | OUTPATIENT
Start: 2021-05-20 | End: 2022-05-05 | Stop reason: SDUPTHER

## 2021-05-20 RX ORDER — LEVOTHYROXINE SODIUM 25 UG/1
25 TABLET ORAL
Qty: 90 TABLET | Refills: 3 | Status: SHIPPED | OUTPATIENT
Start: 2021-05-20 | End: 2021-05-20 | Stop reason: ALTCHOICE

## 2021-05-20 RX ORDER — SITAGLIPTIN AND METFORMIN HYDROCHLORIDE 100; 1000 MG/1; MG/1
TABLET, FILM COATED, EXTENDED RELEASE ORAL
Qty: 90 TABLET | Refills: 3 | Status: SHIPPED | OUTPATIENT
Start: 2021-05-20 | End: 2021-11-15 | Stop reason: SDUPTHER

## 2021-05-20 RX ORDER — ROSUVASTATIN CALCIUM 10 MG/1
10 TABLET, COATED ORAL
Qty: 30 TABLET | Refills: 3 | Status: SHIPPED | OUTPATIENT
Start: 2021-05-20 | End: 2021-11-15 | Stop reason: SDUPTHER

## 2021-05-20 RX ORDER — PRAVASTATIN SODIUM 40 MG/1
40 TABLET ORAL
Qty: 90 TABLET | Refills: 3 | Status: CANCELLED | OUTPATIENT
Start: 2021-05-20

## 2021-05-20 NOTE — LETTER
5/23/2021 Patient: Georgina Archer YOB: 1970 Date of Visit: 5/20/2021 Lyle Dow MD 
[place] [id number] [place] [phone] Via Fax: [phone] Dear Lyle Dow MD, Thank you for referring Mr. Chrystal Subramanian to [facility] for evaluation. My notes for this consultation are attached. If you have questions, please do not hesitate to call me. I look forward to following your patient along with you. Sincerely, Lauren Green MD

## 2021-05-20 NOTE — PROGRESS NOTES
Room:     Identified pt with two pt identifiers(name and ). Reviewed record in preparation for visit and have obtained necessary documentation. All patient medications has been reviewed. Chief Complaint   Patient presents with    Diabetes    Thyroid Problem       Health Maintenance Due   Topic    Hepatitis C Screening     DTaP/Tdap/Td series (1 - Tdap)    Shingrix Vaccine Age 49> (1 of 2)    Colorectal Cancer Screening Combo        Vitals:    21 0926   BP: 115/63   Pulse: 76   Resp: 16   SpO2: 97%   Weight: 186 lb (84.4 kg)   Height: 6' (1.829 m)   PainSc:   0 - No pain       4. Have you been to the ER, urgent care clinic since your last visit? Hospitalized since your last visit? No    5. Have you seen or consulted any other health care providers outside of the [address] [place] [address] since your last visit? Include any pap smears or colon screening. No        Patient is accompanied by self I have received verbal consent from Chrystal Subramanian to discuss any/all medical information while they are present in the room.

## 2021-05-20 NOTE — PROGRESS NOTES
HISTORY OF PRESENT ILLNESS  Chrystal Armstrong is a 44 y.o. male. HPI  First  Follow up visit  After  initial visit for diabetes, hypothyroidism and  hypogonadism     He wants to see if he needs to be on all the meds -   He is off synthroid  2 weeks ago    He stopped xyosted in  Sept 2020   He stopped  pravachol for  20 days , as he was  suffering  From Myalgias       Lost a lb  He feels fine,     July 2020     Patient here for initial visit of Type 2 diabetes mellitus . 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 He had mediastinoscopy LN biopsy done which showed sarcoidosis  November 2018   During 2019 he treated himself for possible tuberculosis. His chest CT in April 2019 showed non-enlarged mediastinal lymph nodes PET CT scan did show a right supraclavicular lymph node in the mediastinum and adenopathy in jeff hepatitis and portocaval area and some right thyroid nodule with uptake  He then had other imaging studies including a PET/CT which showed presence of enlarged lymph nodes in the central axis. He had since followed up with the head of sarcoidosis disease at [facility]/pulmonologist.    In the process, he had gotten high-dose steroids which he has taken for 3 months and during that time he was found to have elevated blood sugar/A1c at the primary care physician's office which would fall into January 2020  He got started on metformin. He has been doing his best to minimize the carbohydrates in his diet.   He still feels very tired and what he has noticed a significantly is feeling drowsy soon after consumption of carbohydrate meal, and the sugars going over 300 mg  Patient started to notice some atypical chest pains again which led to a cardiac catheterization and it is negative for any blocks. He the EKG showed right bundle branch block. He underwent studies to rule out cardiac sarcoidosis and he also had MRI of the brain which was normal.  Current A1C is     7.4 %    FROM June 2020   and symptoms/problems include  Fatigue and  Feeling sleepy after eating   Current diabetic medications include  METFORMIN . Current monitoring regimen: home blood tests - rarely  Home blood sugar records: trend: unknown          PAST MEDICAL HISTORY  : Sarcoidisis, 1.5 YEARS AGO   DIABETES -           Review of Systems   C/o skin bumps - on back etc on and off       Physical Exam   Constitutional:  oriented to person, place, and time. Psychiatric: She has a normal mood and affect. Obtain records from [facility]  Patient's myocardial perfusion study done on July 23, 2020 showed good ejection fraction of 74% and a small septal wall defect of 8%  RI from date July 24, 2020 of brain with and without contrast: No abnormal findings except for retention cysts in the maxillary sinuses  Reviewed the PET/CT done on July 24, 2020 to exclude cardiac sarcoidosis  Multiple hypermetabolic bilateral supraclavicular nodes seen  Hypermetabolic conglomerate of left paratracheal nodes  Hypermetabolic right paratracheal node  Hypermetabolic subcarinal adenopathy  Hypermetabolic bilateral hilar lymph nodes right greater than the left  Hypermetabolic bilateral axillary lymph nodes  Hypermetabolic paraesophageal and left para-aortic node  Scattered hypermetabolic foci in the left lower lobe  Focal increased uptake in the medial right lobe of the liver adjacent to the gallbladder and one more in the caudate lobe  There are lymph nodes enlarged in the jeff hepatitis and portacaval area noted mild splenomegaly  Also noted hypermetabolic foci in the right scapular spine and in the subcutaneous tissues of the posterior chest  No cardiac sarcoidosis          ASSESSMENT and PLAN    1.  Type 2 DM uncontrolled : A1c   Is 6.2 % From    Today   May 2021    Compared   To  July  by POC is 6. 7% compared to 7 %  from January 2020    Reviewed the glucose log : no   Stay on  Janumet XR  Patient is advised to check blood sugars one time daily by rotation method. 2. Hypoglycemia :  Educated on treating the hypoglycemia. 3. HTN : Not on meds. 4.  Dyslipidemia : HE is  OFF   Statins  Because  Of  Severe Myalgias   Tried  Pravachol 40. January 2020 labs indicated LDL of 154, triglycerides 241, total cholesterol 234  Labs pending for the lipid profile      5.  sarcoidisis  : Will rule out any infection of the pituitary gland hence ordered all the pituitary lab profile  Reviewed the notes from Dr. Ana Rodriguez ,  From nov 2019   And from July 2020   Patient has intrathoracic and extrathoracic lymphadenopathy biopsy-proven. He plans to take a second opinion from [place] where there is another sarcoidosis speciality clinic    7. Elevated LFTs : pending labs     8. Subclinical hypothyroidism noted on labs from January 2020  He is off of  synthroid  -  25 mcg a day   Await labs     9. Hypogonadism , pituitary  - ?  Sarcoidosis   He did nto want to continue on TT as he is afraid of its effect on heart -   Stopped it  In oct 2020 after 2 doses   Await labs         Reviewed results with patient and discussed the labs being ordered today/bnv  Patient voiced understanding of plan of care

## 2021-05-20 NOTE — PATIENT INSTRUCTIONS
SPECIFIC INSTRUCTIONS BELOW Start on vascepa 1 gm  Twice a day  
 
 
 janumet xr 100/1000 once a day Synthroid 25 mcg  A day, on empty stomach with water only, no other meds or food or drinks   For next half hour Take any kind of vitamins, calcium, iron   Pills  4 hours later PAY ATTENTION TO THESE GENERAL INSTRUCTIONS  
 
-ANY tests other than blood work, which you opt to do  outside [facility] facilities, you are responsible for prior authorizations if  required  
- [phone] [facility] AVS- PLEASE IGNORE  
- YOUR MED LIST IS NOT UP TO DATE AS SOME CHANGES ARE BEING MADE AFTER THE VISIT - [address] [place] [address] Results *Normal results will not be notified by a phone call starting January 1 2021 *If you have an upcoming visit, the results will be discussed at the visit *Please sign up for MY CHART if you want access to your lab and test results *Abnormal results which require immediate attention will be notified by phone call *Abnormal results which do not require immediate assistance will be notified in 1-2 weeks Refills    -    have your pharmacy send us a refill request 
Phone calls  -  Allow  24 hrs. for non-urgent calls to be returned Prior authorization - It may take 2-4 weeks to process Forms  -  FMLA, DMV etc., will take up to 2 weeks to process Cancellations - please notify the office 2 days in advance Samples  - will only be dispensed at visits  
 
--------------------------------------------------------------------------------------------

## 2021-05-24 LAB
25(OH)D3+25(OH)D2 SERPL-MCNC: 17 NG/ML (ref 30–100)
ALBUMIN SERPL-MCNC: 4.3 G/DL (ref 3.8–4.9)
ALBUMIN/CREAT UR: 4 MG/G CREAT (ref 0–29)
ALBUMIN/GLOB SERPL: 1.1 {RATIO} (ref 1.2–2.2)
ALP SERPL-CCNC: 298 IU/L (ref 48–121)
ALT SERPL-CCNC: 75 IU/L (ref 0–44)
AST SERPL-CCNC: 59 IU/L (ref 0–40)
BASOPHILS # BLD AUTO: 0 X10E3/UL (ref 0–0.2)
BASOPHILS NFR BLD AUTO: 0 %
BILIRUB SERPL-MCNC: 1.1 MG/DL (ref 0–1.2)
BUN SERPL-MCNC: 17 MG/DL (ref 6–24)
BUN/CREAT SERPL: 18 (ref 9–20)
CALCIUM SERPL-MCNC: 9.6 MG/DL (ref 8.7–10.2)
CHLORIDE SERPL-SCNC: 104 MMOL/L (ref 96–106)
CHOLEST SERPL-MCNC: 222 MG/DL (ref 100–199)
CO2 SERPL-SCNC: 21 MMOL/L (ref 20–29)
CREAT SERPL-MCNC: 0.97 MG/DL (ref 0.76–1.27)
CREAT UR-MCNC: 107.9 MG/DL
EOSINOPHIL # BLD AUTO: 0.1 X10E3/UL (ref 0–0.4)
EOSINOPHIL NFR BLD AUTO: 3 %
ERYTHROCYTE [DISTWIDTH] IN BLOOD BY AUTOMATED COUNT: 14.3 % (ref 11.6–15.4)
FSH SERPL-ACNC: 5.7 MIU/ML (ref 1.5–12.4)
GLOBULIN SER CALC-MCNC: 3.8 G/DL (ref 1.5–4.5)
GLUCOSE SERPL-MCNC: 110 MG/DL (ref 65–99)
HCT VFR BLD AUTO: 40.6 % (ref 37.5–51)
HDLC SERPL-MCNC: 38 MG/DL
HGB BLD-MCNC: 13.7 G/DL (ref 13–17.7)
IMM GRANULOCYTES # BLD AUTO: 0 X10E3/UL (ref 0–0.1)
IMM GRANULOCYTES NFR BLD AUTO: 0 %
IMP & REVIEW OF LAB RESULTS: NORMAL
LDLC SERPL CALC-MCNC: 150 MG/DL (ref 0–99)
LH SERPL-ACNC: 5.3 MIU/ML (ref 1.7–8.6)
LYMPHOCYTES # BLD AUTO: 1 X10E3/UL (ref 0.7–3.1)
LYMPHOCYTES NFR BLD AUTO: 18 %
MCH RBC QN AUTO: 28.1 PG (ref 26.6–33)
MCHC RBC AUTO-ENTMCNC: 33.7 G/DL (ref 31.5–35.7)
MCV RBC AUTO: 83 FL (ref 79–97)
MICROALBUMIN UR-MCNC: 3.8 UG/ML
MONOCYTES # BLD AUTO: 0.7 X10E3/UL (ref 0.1–0.9)
MONOCYTES NFR BLD AUTO: 14 %
NEUTROPHILS # BLD AUTO: 3.3 X10E3/UL (ref 1.4–7)
NEUTROPHILS NFR BLD AUTO: 65 %
PLATELET # BLD AUTO: 141 X10E3/UL (ref 150–450)
POTASSIUM SERPL-SCNC: 4.6 MMOL/L (ref 3.5–5.2)
PROT SERPL-MCNC: 8.1 G/DL (ref 6–8.5)
RBC # BLD AUTO: 4.87 X10E6/UL (ref 4.14–5.8)
SODIUM SERPL-SCNC: 138 MMOL/L (ref 134–144)
TESTOST FREE SERPL-MCNC: 4.5 PG/ML (ref 7.2–24)
TESTOST SERPL-MCNC: 242 NG/DL (ref 264–916)
TRIGL SERPL-MCNC: 184 MG/DL (ref 0–149)
TSH SERPL DL<=0.005 MIU/L-ACNC: 4.33 UIU/ML (ref 0.45–4.5)
VLDLC SERPL CALC-MCNC: 34 MG/DL (ref 5–40)
WBC # BLD AUTO: 5.2 X10E3/UL (ref 3.4–10.8)

## 2021-06-16 ENCOUNTER — DOCUMENTATION ONLY (OUTPATIENT)
Dept: ENDOCRINOLOGY | Age: 51
End: 2021-06-16

## 2021-06-16 NOTE — PROGRESS NOTES
Discussed results with patient on phone -     He resumed synthroid 25 mcg dose   He resumed crestor after he checked his labs,  He is taking vascepa , recently approved     He will be seeing hepatology at [facility] - changing from [facility]     No vit D supplementation - alerted him because of sarcoidosis       Christin Vaughn MD

## 2021-11-15 ENCOUNTER — OFFICE VISIT (OUTPATIENT)
Dept: ENDOCRINOLOGY | Age: 51
End: 2021-11-15
Payer: COMMERCIAL

## 2021-11-15 VITALS
HEIGHT: 72 IN | OXYGEN SATURATION: 97 % | HEART RATE: 61 BPM | RESPIRATION RATE: 18 BRPM | BODY MASS INDEX: 24.87 KG/M2 | TEMPERATURE: 97.2 F | WEIGHT: 183.6 LBS | DIASTOLIC BLOOD PRESSURE: 65 MMHG | SYSTOLIC BLOOD PRESSURE: 112 MMHG

## 2021-11-15 DIAGNOSIS — E03.9 HYPOTHYROIDISM, UNSPECIFIED TYPE: ICD-10-CM

## 2021-11-15 DIAGNOSIS — E78.2 MIXED HYPERLIPIDEMIA: ICD-10-CM

## 2021-11-15 DIAGNOSIS — E11.29 TYPE II OR UNSPECIFIED TYPE DIABETES MELLITUS WITH RENAL MANIFESTATIONS, UNCONTROLLED(250.42) (HCC): Primary | ICD-10-CM

## 2021-11-15 DIAGNOSIS — E11.65 TYPE II OR UNSPECIFIED TYPE DIABETES MELLITUS WITH RENAL MANIFESTATIONS, UNCONTROLLED(250.42) (HCC): Primary | ICD-10-CM

## 2021-11-15 DIAGNOSIS — D86.9 SARCOIDOSIS: ICD-10-CM

## 2021-11-15 PROCEDURE — 99214 OFFICE O/P EST MOD 30 MIN: CPT | Performed by: INTERNAL MEDICINE

## 2021-11-15 RX ORDER — AZATHIOPRINE 50 MG/1
100 TABLET ORAL DAILY
COMMUNITY
Start: 2021-10-29

## 2021-11-15 RX ORDER — SITAGLIPTIN AND METFORMIN HYDROCHLORIDE 100; 1000 MG/1; MG/1
TABLET, FILM COATED, EXTENDED RELEASE ORAL
Qty: 90 TABLET | Refills: 3 | Status: SHIPPED | OUTPATIENT
Start: 2021-11-15 | End: 2022-05-05 | Stop reason: SDUPTHER

## 2021-11-15 RX ORDER — ROSUVASTATIN CALCIUM 10 MG/1
10 TABLET, COATED ORAL
Qty: 30 TABLET | Refills: 3 | Status: SHIPPED | OUTPATIENT
Start: 2021-11-15 | End: 2022-05-05 | Stop reason: SDUPTHER

## 2021-11-15 RX ORDER — PREDNISONE 10 MG/1
5 TABLET ORAL DAILY
COMMUNITY
Start: 2021-10-29

## 2021-11-15 RX ORDER — GLIPIZIDE 5 MG/1
5 TABLET ORAL 2 TIMES DAILY
Qty: 90 TABLET | Refills: 5 | Status: SHIPPED | OUTPATIENT
Start: 2021-11-15 | End: 2022-05-08

## 2021-11-15 RX ORDER — PANTOPRAZOLE SODIUM 20 MG/1
20 TABLET, DELAYED RELEASE ORAL
COMMUNITY
Start: 2021-08-23

## 2021-11-15 NOTE — PROGRESS NOTES
HISTORY OF PRESENT ILLNESS  Chrystal Dejesus is a 46 y.o. male. 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     One week ago  , prednisone  10 mg a day and Azathioprine was prescribed     He is going to get  Consultation at  [address] [place] [address] for second opinion   He is now doing anti-inflammatory diet  To reduce  liver inflammation     He continuesto have fatigue   And he feels better with taking steroids and initiating on synthroid   Lost 3 lbs       May 2021     He wants to see if he needs to be on all the meds -   He is off synthroid  2 weeks ago    He stopped xyosted in  Sept 2020   He stopped  pravachol for  20 days , as he was  suffering  From Myalgias   Lost a lb  He feels fine,     July 2020     Patient here for initial visit of Type 2 diabetes mellitus . 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 He had mediastinoscopy LN biopsy done which showed sarcoidosis  November 2018   During 2019 he treated himself for possible tuberculosis. His chest CT in April 2019 showed non-enlarged mediastinal lymph nodes PET CT scan did show a right supraclavicular lymph node in the mediastinum and adenopathy in jeff hepatitis and portocaval area and some right thyroid nodule with uptake  He then had other imaging studies including a PET/CT which showed presence of enlarged lymph nodes in the central axis.   He had since followed up with the head of sarcoidosis disease at [facility]/pulmonologist.    In the process, he had gotten high-dose steroids which he has taken for 3 months and during that time he was found to have elevated blood sugar/A1c at the primary care physician's office which would fall into January 2020  He got started on metformin. He has been doing his best to minimize the carbohydrates in his diet. He still feels very tired and what he has noticed a significantly is feeling drowsy soon after consumption of carbohydrate meal, and the sugars going over 300 mg  Patient started to notice some atypical chest pains again which led to a cardiac catheterization and it is negative for any blocks. He the EKG showed right bundle branch block. He underwent studies to rule out cardiac sarcoidosis and he also had MRI of the brain which was normal.  Current A1C is     7.4 %    FROM June 2020   and symptoms/problems include  Fatigue and  Feeling sleepy after eating   Current diabetic medications include  METFORMIN . Current monitoring regimen: home blood tests - rarely  Home blood sugar records: trend: unknown        Physical Exam   Constitutional:  oriented to person, place, and time. Fatigued     Reviewed  records from [facility]  Patient's myocardial perfusion study done on July 23, 2020 showed good ejection fraction of 74% and a small septal wall defect of 8%  RI from date July 24, 2020 of brain with and without contrast: No abnormal findings except for retention cysts in the maxillary sinuses  Reviewed the PET/CT done on July 24, 2020 to exclude cardiac sarcoidosis  Multiple hypermetabolic bilateral supraclavicular nodes seen ; Hypermetabolic conglomerate of left paratracheal nodes ; Hypermetabolic right paratracheal node  Hypermetabolic subcarinal adenopathy ; Hypermetabolic bilateral hilar lymph nodes right greater than the left ;  Hypermetabolic bilateral axillary lymph nodes  Hypermetabolic paraesophageal and left para-aortic node ; Scattered hypermetabolic foci in the left lower lobe  Focal increased uptake in the medial right lobe of the liver adjacent to the gallbladder and one more in the caudate lobe  There are lymph nodes enlarged in the jeff hepatitis and portacaval area noted mild splenomegaly  Also noted hypermetabolic foci in the right scapular spine and in the subcutaneous tissues of the posterior chest  No cardiac sarcoidosis      Reviewed labs from [facility]   Sept to oct 2021     ASSESSMENT and PLAN    1. Type 2 DM uncontrolled : A1c   Is   7 %   From sept 2021   Compared to  6.2 %     From    Today   May 2021    Compared   To  July  by POC is 6. 7% compared to 7 %  from January 2020 Nov 2021       Slight loss of glycemic control   Stay on janumet xr   Just started on   Steroids to decrease liver inflammation   Suggesting use of Glipizide with prednisone   Suggesting use of SIOBHAN         MAy 2021     Reviewed the glucose log : no   Stay on  Janumet XR  Patient is advised to check blood sugars one time daily by rotation method. 2. Hypoglycemia :  Educated on treating the hypoglycemia. 3. HTN : Not on meds. 4.  Dyslipidemia : I am encouraging him to resume crestor  And vascepa which he has discontinued   [facility] clinic has an opinion about taking versus not taking the meds         5. Hepatic and pulmonary sarcoidisis  :   Reviewed the notes from Dr. Sherron Donaldson ,  From nov 2019   And from July 2020   Patient has intrathoracic and extrathoracic lymphadenopathy biopsy-proven. He plans to take a second opinion from [place] where there is another sarcoidosis speciality clinic    7. Elevated LFTs : better now     8. Subclinical hypothyroidism noted on labs from January 2020  He resumed synthroid  -  25 mcg a day  And he feels better   TSH was 3 from sept 2021 labs       9. Hypogonadism , pituitary  - ? Sarcoidosis   He did nto want to continue on TT as he is afraid of its effect on heart - and liver too   Stopped it  In oct 2020 after 2 doses       10.   New lab abnormality  -  PLATELET counts trending down         Reviewed results with patient and discussed the labs being ordered today/bnv  Patient voiced understanding of plan of care

## 2021-11-15 NOTE — LETTER
11/15/2021    Patient: Conception Apley   YOB: 1970   Date of Visit: 11/15/2021     Eulalia Huynh, [address]  [address] [address]  Via Fax: [phone]    Dear Eulalia Huynh MD,      Thank you for referring Mr. Chrystal Subramanian to [facility]OLOG[facility] for evaluation. My notes for this consultation are attached. If you have questions, please do not hesitate to call me. I look forward to following your patient along with you.       Sincerely,    Deepak Russell MD

## 2021-11-15 NOTE — PATIENT INSTRUCTIONS
SPECIFIC INSTRUCTIONS BELOW       Stay on janumet xr     Start on glipizide  5 mg a day  In the morning   With  Prednisone         -------------PAY ATTENTION TO THESE GENERAL INSTRUCTIONS -----------------      - The medications prescribed at this visit will not be available at pharmacy until 6 pm       - YOUR MED LIST IS NOT UP TO DATE AS SOME CHANGES ARE BEING MADE AFTER THE VISIT - FOLLOW SPECIFIC INSTRUCTIONS  ABOVE     -ANY tests other than blood work, which you opt to do  outside the  [facility] facilities, you are responsible for prior authorizations if  required    - [address] [facility]- PLEASE IGNORE     Results     *Normal results will not be notified by a phone call starting January 1 2021   *If you have an upcoming visit, the results will be discussed at the visit   *Please sign up for MY CHART if you want access to your lab and test results  *Abnormal results which require immediate attention will be notified by phone call   *Abnormal results which do not require immediate assistance will be notified in 1-2 weeks       Refills    -    have your pharmacy send us a refill request . Refills are done max for one year and a visit is a must before refills are extended    Follow up appointments -  highly encourage you to make it when you are checking out. We can accommodate you into the schedule based on your clinical situation, but not for extending refills beyond a year. Labs are important to give refills and is important to get labs before the visit     Phone calls  -  Allow  24 hrs.  for non-urgent calls to be returned  Prior authorization - It may take 2-4 weeks to process  Forms  -  FMLA, DMV etc., will take up to 2 weeks to process  Cancellations - please notify the office 2 days in advance   Samples  - will only be dispensed at visits       If not showing for the appointments and cancelling appointments within 24 hours are kept track of and three  of such situations in  two consecutive years will likely be considered for termination from the practice    -------------------------------------------------------------------------------------------------------------------

## 2022-03-15 RX ORDER — LEVOTHYROXINE SODIUM 25 UG/1
25 TABLET ORAL
Qty: 90 TABLET | Refills: 3 | Status: SHIPPED | OUTPATIENT
Start: 2022-03-15

## 2022-03-19 PROBLEM — R50.9 FEVER: Status: ACTIVE | Noted: 2018-09-30

## 2022-04-19 DIAGNOSIS — E03.9 HYPOTHYROIDISM, UNSPECIFIED TYPE: Primary | ICD-10-CM

## 2022-04-19 DIAGNOSIS — E23.0 PITUITARY HYPOGONADISM (HCC): ICD-10-CM

## 2022-04-19 DIAGNOSIS — E11.65 TYPE II OR UNSPECIFIED TYPE DIABETES MELLITUS WITH RENAL MANIFESTATIONS, UNCONTROLLED(250.42) (HCC): ICD-10-CM

## 2022-04-19 DIAGNOSIS — E83.51 LOW CALCIUM LEVELS: ICD-10-CM

## 2022-04-19 DIAGNOSIS — E78.2 MIXED HYPERLIPIDEMIA: ICD-10-CM

## 2022-04-19 DIAGNOSIS — E55.9 VITAMIN D DEFICIENCY: ICD-10-CM

## 2022-04-19 DIAGNOSIS — E11.29 TYPE II OR UNSPECIFIED TYPE DIABETES MELLITUS WITH RENAL MANIFESTATIONS, UNCONTROLLED(250.42) (HCC): ICD-10-CM

## 2022-04-19 DIAGNOSIS — D86.9 SARCOIDOSIS: ICD-10-CM

## 2022-05-05 ENCOUNTER — OFFICE VISIT (OUTPATIENT)
Dept: ENDOCRINOLOGY | Age: 52
End: 2022-05-05
Payer: COMMERCIAL

## 2022-05-05 VITALS
OXYGEN SATURATION: 96 % | DIASTOLIC BLOOD PRESSURE: 79 MMHG | HEART RATE: 76 BPM | TEMPERATURE: 98.1 F | HEIGHT: 72 IN | SYSTOLIC BLOOD PRESSURE: 120 MMHG | WEIGHT: 179 LBS | BODY MASS INDEX: 24.24 KG/M2

## 2022-05-05 DIAGNOSIS — D86.9 SARCOIDOSIS: ICD-10-CM

## 2022-05-05 DIAGNOSIS — E11.29 TYPE II OR UNSPECIFIED TYPE DIABETES MELLITUS WITH RENAL MANIFESTATIONS, UNCONTROLLED(250.42) (HCC): Primary | ICD-10-CM

## 2022-05-05 DIAGNOSIS — T38.0X5D STEROID-INDUCED DIABETES MELLITUS, SUBSEQUENT ENCOUNTER (HCC): ICD-10-CM

## 2022-05-05 DIAGNOSIS — E09.9 STEROID-INDUCED DIABETES MELLITUS, SUBSEQUENT ENCOUNTER (HCC): ICD-10-CM

## 2022-05-05 DIAGNOSIS — E11.65 TYPE II OR UNSPECIFIED TYPE DIABETES MELLITUS WITH RENAL MANIFESTATIONS, UNCONTROLLED(250.42) (HCC): Primary | ICD-10-CM

## 2022-05-05 DIAGNOSIS — E03.9 HYPOTHYROIDISM, UNSPECIFIED TYPE: ICD-10-CM

## 2022-05-05 DIAGNOSIS — E78.2 MIXED HYPERLIPIDEMIA: ICD-10-CM

## 2022-05-05 PROCEDURE — 99215 OFFICE O/P EST HI 40 MIN: CPT | Performed by: INTERNAL MEDICINE

## 2022-05-05 RX ORDER — INSULIN GLARGINE 100 [IU]/ML
INJECTION, SOLUTION SUBCUTANEOUS
Qty: 2 EACH | Refills: 0 | Status: SHIPPED | COMMUNITY
Start: 2022-05-05 | End: 2022-09-28

## 2022-05-05 RX ORDER — INSULIN LISPRO-AABC 100 [IU]/ML
INJECTION, SOLUTION SUBCUTANEOUS
Qty: 2 EACH | Refills: 0 | Status: SHIPPED | COMMUNITY
Start: 2022-05-05 | End: 2022-09-28

## 2022-05-05 NOTE — LETTER
5/8/2022    Patient: Kelsey Price   YOB: 1970   Date of Visit: 5/5/2022     Yevgeniy Khan, [address]  [address] [address]  Via Fax: [phone]    Dear Yevgeniy Khan MD,      Thank you for referring Mr. Chrystal Subramanian to [facility]OLOG[facility] for evaluation. My notes for this consultation are attached. If you have questions, please do not hesitate to call me. I look forward to following your patient along with you.       Sincerely,    Sofia Waters MD

## 2022-05-05 NOTE — PROGRESS NOTES
HISTORY OF PRESENT ILLNESS  Chrystal Lake is a 46 y.o. male. Follow up visit  After  Last  visit for diabetes, hypothyroidism from Nov 2021     Lost 4 lbs   He had his visit at [facility] sarcoidosis clinic, and was found to have a1c over 12 %   He has noticed weight loss, lethargy   He was tapered to prednisone 5 mg a day from 10 mg a day   He is also anxious about low PLT count           Nov 2021     Pt has hepatitis/ fibrosis - stage 3 from sarcoidosis   One week ago  , prednisone  10 mg a day and Azathioprine was prescribed   He is going to get  Consultation at  [address] [place] [address] for second opinion   He is now doing anti-inflammatory diet  To reduce  liver inflammation   He continuesto have fatigue   And he feels better with taking steroids and initiating on synthroid   Lost 3 lbs       July 2020     Patient here for initial visit of Type 2 diabetes mellitus . 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 He had mediastinoscopy LN biopsy done which showed sarcoidosis  November 2018   During 2019 he treated himself for possible tuberculosis. His chest CT in April 2019 showed non-enlarged mediastinal lymph nodes PET CT scan did show a right supraclavicular lymph node in the mediastinum and adenopathy in jeff hepatitis and portocaval area and some right thyroid nodule with uptake  He then had other imaging studies including a PET/CT which showed presence of enlarged lymph nodes in the central axis.   He had since followed up with the head of sarcoidosis disease at [facility]/pulmonologist.    In the process, he had gotten high-dose steroids which he has taken for 3 months and during that time he was found to have elevated blood sugar/A1c at the primary care physician's office which would fall into January 2020  He got started on metformin. He has been doing his best to minimize the carbohydrates in his diet. He still feels very tired and what he has noticed a significantly is feeling drowsy soon after consumption of carbohydrate meal, and the sugars going over 300 mg  Patient started to notice some atypical chest pains again which led to a cardiac catheterization and it is negative for any blocks. He the EKG showed right bundle branch block. He underwent studies to rule out cardiac sarcoidosis and he also had MRI of the brain which was normal.  Current A1C is     7.4 %    FROM June 2020   and symptoms/problems include  Fatigue and  Feeling sleepy after eating   Current diabetic medications include  METFORMIN . Physical Exam   Constitutional:  oriented to person, place, and time. Fatigued       Patient's myocardial perfusion study done on July 23, 2020 showed good ejection fraction of 74% and a small septal wall defect of 8%  RI from date July 24, 2020 of brain with and without contrast: No abnormal findings except for retention cysts in the maxillary sinuses  Reviewed the PET/CT done on July 24, 2020 to exclude cardiac sarcoidosis  Multiple hypermetabolic bilateral supraclavicular nodes seen ; Hypermetabolic conglomerate of left paratracheal nodes ; Hypermetabolic right paratracheal node  Hypermetabolic subcarinal adenopathy ; Hypermetabolic bilateral hilar lymph nodes right greater than the left ;  Hypermetabolic bilateral axillary lymph nodes  Hypermetabolic paraesophageal and left para-aortic node ; Scattered hypermetabolic foci in the left lower lobe  Focal increased uptake in the medial right lobe of the liver adjacent to the gallbladder and one more in the caudate lobe  There are lymph nodes enlarged in the jeff hepatitis and portacaval area noted mild splenomegaly  Also noted hypermetabolic foci in the right scapular spine and in the subcutaneous tissues of the posterior chest  No cardiac sarcoidosis      Reviewed labs from [facility]   April 2022         ASSESSMENT and PLAN    1. Type 2 DM uncontrolled : A1c   Is  Over 12  %  From [facility]  April 29 2022  compared to    7 %   From sept 2021   Compared to  6.2 %     From    Today   May 2021    Compared   To  July  by POC is 6. 7% compared to 7 %  from January 2020        May  2022     Lost glycemic control   He has not been checking sugars   His steroid doses have been increased   He has stopped taking glipizide as he felt hypoglycemic couple of time s  He stayed only on janumet xr   Resuming glipizide 5 mg bid       Pt will start in basal bolus regimen as sugars are high and he has noted blurred vision significantly   Gave him siobhan 2 sample sensors for now     Rikki   is being seen for DM type 2  Patient  performs 4 times of blood glucose checks a day utilizing the home BGM. Patient  uses  subcutaneous 4  Insulin  shots  therapy to treat  diabetes. Patient does adjustments to the regimen by sliding scale or bolus wizard  on the basis of therapeutic CGM testing results        Nov 2021     Slight loss of glycemic control   Stay on janumet xr   Just started on   Steroids to decrease liver inflammation   Suggesting use of Glipizide with prednisone   Suggesting use of SIOBHAN       2. Hypoglycemia :  Educated on treating the hypoglycemia. 3. HTN : Not on meds. 4.  Dyslipidemia : on crestor  And vascepa       5. Hepatic and pulmonary sarcoidisis  :   Reviewed the notes from Dr. Anjelica Rodriguez ,  From nov 2019   And from July 2020   Patient has intrathoracic and extrathoracic lymphadenopathy biopsy-proven. He plans to take a second opinion from [place] where there is another sarcoidosis speciality clinic    7. Elevated LFTs / low PLT count     8. Subclinical hypothyroidism on  synthroid  -  25 mcg a day      9. Hypogonadism , pituitary  - ?  Sarcoidosis   He did nto want to continue on TT as he is afraid of its effect on heart - and liver too   Stopped it  In oct 2020 after 2 doses       10.   New lab abnormality  -  PLATELET counts trending down         Reviewed results with patient and discussed the labs being ordered today/bnv  Patient voiced understanding of plan of care

## 2022-05-05 NOTE — PROGRESS NOTES
Keshia Sevilla is a 46 y.o. male here for   Chief Complaint   Patient presents with    Diabetes    Thyroid Problem       1. Have you been to the ER, urgent care clinic since your last visit? Hospitalized since your last visit? - no    2. Have you seen or consulted any other health care providers outside of the [address] [place] [address] since your last visit?   Include any pap smears or colon screening.- no

## 2022-05-05 NOTE — PATIENT INSTRUCTIONS
SPECIFIC INSTRUCTIONS BELOW     RELION METER   And test strips         DRINK water   Do not skip meals  Do not eat in between meals    Reduce carbs- pasta, rice, potatoes, bread   Try to avoid processed bread products like BISCUITS, CROISSANTS, MUFFINS    Do not drink juices or sodas, even if they are calorie zero or diet drinks and especially avoid using powders like crystalloids , DORA-AIDS     Do not eat peanut butter     Do not eat sugar free cookies and cakes   Do not eat peaches, oranges, pineapples, raisins, grapes , canteloupe , honey dew, mangoes , watermelon  and fruit medleys      -----------------------------------------------------------------------------------------------------------------------      Stay on janumet xr        glipizide 5 mg one pill twice  A day , twenty min  Before  b-fast and dinner         ----------------------------------------------------------------------------------------------------------------------      Plan  B     Start on lantus  28 units  At bed time  ( stop glipizide  )   OR   Basaglar      Start on novolog   6 units before each meal   OR   Lyeumjev  And    Take additional novolog  as folllows with meals:    150-200 mg 2 units    201-250 mg 4 units    251-300 mg 6 units    301-350 mg 8  units    351-400 mg 10 units    401-450 mg 12  units    451-500 mg 14  units    Less than 70 mg  - no insulin        -------------PAY ATTENTION TO THESE GENERAL INSTRUCTIONS -----------------      - The medications prescribed at this visit will not be available at pharmacy until 6 pm       - YOUR MED LIST IS NOT UP TO DATE AS SOME CHANGES ARE BEING MADE AFTER THE VISIT - FOLLOW SPECIFIC INSTRUCTIONS  ABOVE     -ANY tests other than blood work, which you opt to do  outside the  [facility] imaging facilities, you are responsible for prior authorizations if  required    - [address] [facility]- PLEASE IGNORE     Results     *Normal results will not be notified by a phone call starting January 1 2021   *If you have an upcoming visit, the results will be discussed at the visit   *Please sign up for MY CHART if you want access to your lab and test results  *Abnormal results which require immediate attention will be notified by phone call   *Abnormal results which do not require immediate assistance will be notified in 1-2 weeks       Refills    -    have your pharmacy send us a refill request . Refills are done max for one year and a visit is a must before refills are extended    Follow up appointments -  highly encourage you to make it when you are checking out. We can accommodate you into the schedule based on your clinical situation, but not for extending refills beyond a year. Labs are important to give refills and is important to get labs before the visit     Phone calls  -  Allow  24 hrs.  for non-urgent calls to be returned  Prior authorization - It may take 2-4 weeks to process  Forms  -  FMLA, DMV etc., will take up to 2 weeks to process  Cancellations - please notify the office 2 days in advance   Samples  - will only be dispensed at visits       If not showing for the appointments and cancelling appointments within 24 hours are kept track of and three  of such situations in  two consecutive years will likely be considered for termination from the practice    -------------------------------------------------------------------------------------------------------------------

## 2022-05-08 RX ORDER — GLIPIZIDE 5 MG/1
5 TABLET ORAL 2 TIMES DAILY
Qty: 180 TABLET | Refills: 3
Start: 2022-05-08 | End: 2022-06-02 | Stop reason: SDUPTHER

## 2022-06-01 ENCOUNTER — TELEPHONE (OUTPATIENT)
Dept: ENDOCRINOLOGY | Age: 52
End: 2022-06-01

## 2022-06-01 NOTE — TELEPHONE ENCOUNTER
Patient called to speak to nursing about test. Was not specific. Please call, has an appt tomorrow morning. Remind Dr. Andrade Shells she has an 830am appt.  Thank you

## 2022-06-01 NOTE — TELEPHONE ENCOUNTER
Returned call to patient. He inquires if he needs to have labs drawn for his visit tomorrow. Inquired if patient had recent labs drawn as it looks like they were done 04/28/22. He states that he did have labs drawn in April for [facility]. Advised patient that everything that Dr William Duggan ordered was done by [facility] on 04/28/22 so he does not need labs drawn prior to tomorrow's visit. He verbalized understanding.

## 2022-06-02 ENCOUNTER — OFFICE VISIT (OUTPATIENT)
Dept: ENDOCRINOLOGY | Age: 52
End: 2022-06-02
Payer: COMMERCIAL

## 2022-06-02 VITALS
BODY MASS INDEX: 25.06 KG/M2 | TEMPERATURE: 98.5 F | HEART RATE: 80 BPM | OXYGEN SATURATION: 95 % | WEIGHT: 185 LBS | HEIGHT: 72 IN | DIASTOLIC BLOOD PRESSURE: 80 MMHG | SYSTOLIC BLOOD PRESSURE: 125 MMHG

## 2022-06-02 DIAGNOSIS — E11.29 TYPE II OR UNSPECIFIED TYPE DIABETES MELLITUS WITH RENAL MANIFESTATIONS, UNCONTROLLED(250.42) (HCC): Primary | ICD-10-CM

## 2022-06-02 DIAGNOSIS — D86.9 SARCOIDOSIS: ICD-10-CM

## 2022-06-02 DIAGNOSIS — E11.65 TYPE II OR UNSPECIFIED TYPE DIABETES MELLITUS WITH RENAL MANIFESTATIONS, UNCONTROLLED(250.42) (HCC): Primary | ICD-10-CM

## 2022-06-02 DIAGNOSIS — E78.2 MIXED HYPERLIPIDEMIA: ICD-10-CM

## 2022-06-02 DIAGNOSIS — E09.9 STEROID-INDUCED DIABETES MELLITUS, SUBSEQUENT ENCOUNTER (HCC): ICD-10-CM

## 2022-06-02 DIAGNOSIS — E03.9 HYPOTHYROIDISM, UNSPECIFIED TYPE: ICD-10-CM

## 2022-06-02 DIAGNOSIS — T38.0X5D STEROID-INDUCED DIABETES MELLITUS, SUBSEQUENT ENCOUNTER (HCC): ICD-10-CM

## 2022-06-02 PROCEDURE — 99214 OFFICE O/P EST MOD 30 MIN: CPT | Performed by: INTERNAL MEDICINE

## 2022-06-02 RX ORDER — FLASH GLUCOSE SENSOR
KIT MISCELLANEOUS
Qty: 2 KIT | Refills: 5 | Status: SHIPPED | OUTPATIENT
Start: 2022-06-02 | End: 2022-08-19 | Stop reason: SDUPTHER

## 2022-06-02 RX ORDER — FLASH GLUCOSE SENSOR
KIT MISCELLANEOUS
Qty: 2 KIT | Refills: 5 | Status: SHIPPED | OUTPATIENT
Start: 2022-06-02 | End: 2022-06-02 | Stop reason: SDUPTHER

## 2022-06-02 RX ORDER — SITAGLIPTIN AND METFORMIN HYDROCHLORIDE 100; 1000 MG/1; MG/1
1 TABLET, FILM COATED, EXTENDED RELEASE ORAL DAILY
Qty: 90 TABLET | Refills: 3 | Status: SHIPPED | OUTPATIENT
Start: 2022-06-02

## 2022-06-02 RX ORDER — GLIPIZIDE 5 MG/1
TABLET ORAL
Qty: 180 TABLET | Refills: 3 | Status: SHIPPED | OUTPATIENT
Start: 2022-06-02 | End: 2022-09-28

## 2022-06-02 NOTE — LETTER
6/2/2022    Patient: Toi Aviles   YOB: 1970   Date of Visit: 6/2/2022     Fernando Monet, [address]  [address] [address]  Via Fax: [phone]    Dear Fernando Monet MD,      Thank you for referring Mr. Chrystal Subramanian to [facility] for evaluation. My notes for this consultation are attached. If you have questions, please do not hesitate to call me. I look forward to following your patient along with you.       Sincerely,    Max Carty MD

## 2022-06-02 NOTE — PROGRESS NOTES
HISTORY OF PRESENT ILLNESS  Chrystal Abdalla is a 46 y.o. male. Follow up visit  After  Last  visit for diabetes, hypothyroidism from May 2022   Gained 6 lbs   Now only taking glipizide 5 mg a day  In  AM  After a very low sugar he had during the night time   He moved to [employer]  xr  To dinner   He no longer Is lethargic         MAy 2022     Lost 4 lbs   He had his visit at [address] [place] [address] sarcoidosis clinic, and was found to have a1c over 12 %   He has noticed weight loss, lethargy   He was tapered to prednisone 5 mg a day from 10 mg a day   He is also anxious about low PLT count           Nov 2021     Pt has hepatitis/ fibrosis - stage 3 from sarcoidosis   One week ago  , prednisone  10 mg a day and Azathioprine was prescribed   He is going to get  Consultation at  [address] [place] [address] for second opinion   He is now doing anti-inflammatory diet  To reduce  liver inflammation   He continuesto have fatigue   And he feels better with taking steroids and initiating on synthroid   Lost 3 lbs       July 2020     Patient here for initial visit of Type 2 diabetes mellitus . 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 He had mediastinoscopy LN biopsy done which showed sarcoidosis  November 2018   During 2019 he treated himself for possible tuberculosis. His chest CT in April 2019 showed non-enlarged mediastinal lymph nodes PET CT scan did show a right supraclavicular lymph node in the mediastinum and adenopathy in jeff hepatitis and portocaval area and some right thyroid nodule with uptake  He then had other imaging studies including a PET/CT which showed presence of enlarged lymph nodes in the central axis.   He had since followed up with the head of sarcoidosis disease at [facility]/pulmonologist.    In the process, he had gotten high-dose steroids which he has taken for 3 months and during that time he was found to have elevated blood sugar/A1c at the primary care physician's office which would fall into January 2020  He got started on metformin. He has been doing his best to minimize the carbohydrates in his diet. He still feels very tired and what he has noticed a significantly is feeling drowsy soon after consumption of carbohydrate meal, and the sugars going over 300 mg  Patient started to notice some atypical chest pains again which led to a cardiac catheterization and it is negative for any blocks. He the EKG showed right bundle branch block. He underwent studies to rule out cardiac sarcoidosis and he also had MRI of the brain which was normal.  Current A1C is     7.4 %    FROM June 2020   and symptoms/problems include  Fatigue and  Feeling sleepy after eating   Current diabetic medications include  METFORMIN . Physical Exam   Constitutional:  oriented to person, place, and time. Fatigued       Patient's myocardial perfusion study done on July 23, 2020 showed good ejection fraction of 74% and a small septal wall defect of 8%  RI from date July 24, 2020 of brain with and without contrast: No abnormal findings except for retention cysts in the maxillary sinuses  Reviewed the PET/CT done on July 24, 2020 to exclude cardiac sarcoidosis  Multiple hypermetabolic bilateral supraclavicular nodes seen ; Hypermetabolic conglomerate of left paratracheal nodes ; Hypermetabolic right paratracheal node  Hypermetabolic subcarinal adenopathy ; Hypermetabolic bilateral hilar lymph nodes right greater than the left ;  Hypermetabolic bilateral axillary lymph nodes  Hypermetabolic paraesophageal and left para-aortic node ; Scattered hypermetabolic foci in the left lower lobe  Focal increased uptake in the medial right lobe of the liver adjacent to the gallbladder and one more in the caudate lobe  There are lymph nodes enlarged in the jeff hepatitis and portacaval area noted mild splenomegaly  Also noted hypermetabolic foci in the right scapular spine and in the subcutaneous tissues of the posterior chest  No cardiac sarcoidosis    Reviewed labs from [facility]   April 2022     ASSESSMENT and PLAN    1. Type 2 DM uncontrolled : A1c   Is    ? Compared to  Over 12  %  From [facility]  April 29 2022  compared to    7 %   From sept 2021   Compared to  6.2 %     From    Today   May 2021    Compared   To  July  by POC is 6. 7% compared to 7 %  from January 2020    improved  glycemic control by Costella Sicard 2   He is using Costella Sicard 2   He is on  Prednisone  5 mg a day   Stay on janumet xr 100/1000 once a day   Stay  On glipizide 5 mg a day  In AM  He will stay on  basal bolus regimen as his prednisone  Is changed for  sarcoidosis flares a lot       Reviewed   Forrstw AGP  report for 14 days and discussed with pt    - 14 day average glucose 144  - 18 % for high ;  0 %  Very high   and 0 %  low sugars and % in Target  Range  82 %    - percent of utiization 94  %  - CV% 26.4%      Chrystal Subramanian   is being seen for DM type 2  Patient  performs 4 times of blood glucose checks a day utilizing the home BGM. Patient  uses  subcutaneous 4  Insulin  shots  therapy to treat  diabetes.   Patient does adjustments to the regimen by sliding scale or bolus wizard  on the basis of therapeutic CGM testing results        May  2022     Lost glycemic control   He has not been checking sugars   His steroid doses have been increased   He has stopped taking glipizide as he felt hypoglycemic couple of time s  He stayed only on janumet xr   Resuming glipizide 5 mg bid     Pt will start in basal bolus regimen as sugars are high and he has noted blurred vision significantly   Gave him luz maria 2 sample sensors for now         Nov 2021     Slight loss of glycemic control   Stay on janumet xr   Just started on   Steroids to decrease liver inflammation Suggesting use of Glipizide with prednisone   Suggesting use of SIOBHAN       2. Hypoglycemia :  Educated on treating the hypoglycemia. 3. HTN : Not on meds. 4.  Dyslipidemia : on crestor  And vascepa       5. Hepatic and pulmonary sarcoidisis  :   Follows  Dr. Afia Javed  Pulmonologist , plan Is to taper off prednisone after repeat PET ct scan     7. Subclinical hypothyroidism on  synthroid  -  25 mcg a day      8. Hypogonadism , pituitary  - ? Sarcoidosis   He did nto want to continue on TT as he is afraid of its effect on heart - and liver too   Stopped it  In oct 2020 after 2 doses     9. Low  PLATELET count - 87 from 78 - improved some     10.  On steroids for sarcoidisis         Reviewed results with patient and discussed the labs being ordered today/bnv  Patient voiced understanding of plan of care

## 2022-06-02 NOTE — PATIENT INSTRUCTIONS
SPECIFIC INSTRUCTIONS BELOW       RELION METER   And test strips As a back up         [email or web address] [employer] water   Do not skip meals  Do not eat in between meals    Reduce carbs- pasta, rice, potatoes, bread   Try to avoid processed bread products like BISCUITS, CROISSANTS, MUFFINS    Do not drink juices or sodas, even if they are calorie zero or diet drinks and especially avoid using powders like crystalloids , DORA-AIDS     Do not eat peanut butter     Do not eat sugar free cookies and cakes   Do not eat peaches, oranges, pineapples, raisins, grapes , canteloupe , honey dew, mangoes , watermelon  and fruit medleys      -----------------------------------------------------------------------------------------------------------------------      Stay on janumet xr   100/1000 once a day      glipizide 5 mg one pill twice  A day , twenty min  Before  b-fast and dinner   DNCP       ----------------------------------------------------------------------------------------------------------------------      Plan  B     Start on lantus  28 units  At bed time  ( stop glipizide  )   OR   Basaglar      Start on novolog   6 units before each meal   OR   Lyeumjev  And    Take additional novolog  as folllows with meals:    150-200 mg 2 units    201-250 mg 4 units    251-300 mg 6 units    301-350 mg 8  units    351-400 mg 10 units    401-450 mg 12  units    451-500 mg 14  units    Less than 70 mg  - no insulin        -------------PAY ATTENTION TO THESE GENERAL INSTRUCTIONS -----------------      - The medications prescribed at this visit will not be available at pharmacy until 6 pm       - YOUR MED LIST IS NOT UP TO DATE AS SOME CHANGES ARE BEING MADE AFTER THE VISIT - FOLLOW SPECIFIC INSTRUCTIONS  ABOVE     -ANY tests other than blood work, which you opt to do  outside the  [facility] imaging facilities, you are responsible for prior authorizations if  required    - [address] [facility] Results     *Normal results will not be notified by a phone call starting January 1 2021   *If you have an upcoming visit, the results will be discussed at the visit   *Please sign up for MY CHART if you want access to your lab and test results  *Abnormal results which require immediate attention will be notified by phone call   *Abnormal results which do not require immediate assistance will be notified in 1-2 weeks       Refills    -    have your pharmacy send us a refill request . Refills are done max for one year and a visit is a must before refills are extended    Follow up appointments -  highly encourage you to make it when you are checking out. We can accommodate you into the schedule based on your clinical situation, but not for extending refills beyond a year. Labs are important to give refills and is important to get labs before the visit     Phone calls  -  Allow  24 hrs.  for non-urgent calls to be returned  Prior authorization - It may take 2-4 weeks to process  Forms  -  FMLA, DMV etc., will take up to 2 weeks to process  Cancellations - please notify the office 2 days in advance   Samples  - will only be dispensed at visits       If not showing for the appointments and cancelling appointments within 24 hours are kept track of and three  of such situations in  two consecutive years will likely be considered for termination from the practice    -------------------------------------------------------------------------------------------------------------------

## 2022-07-25 ENCOUNTER — DOCUMENTATION ONLY (OUTPATIENT)
Dept: ENDOCRINOLOGY | Age: 52
End: 2022-07-25

## 2022-07-25 DIAGNOSIS — E11.65 TYPE II OR UNSPECIFIED TYPE DIABETES MELLITUS WITH RENAL MANIFESTATIONS, UNCONTROLLED(250.42) (HCC): ICD-10-CM

## 2022-07-25 DIAGNOSIS — E11.29 TYPE II OR UNSPECIFIED TYPE DIABETES MELLITUS WITH RENAL MANIFESTATIONS, UNCONTROLLED(250.42) (HCC): ICD-10-CM

## 2022-07-25 DIAGNOSIS — E03.9 HYPOTHYROIDISM, UNSPECIFIED TYPE: ICD-10-CM

## 2022-07-25 DIAGNOSIS — E78.2 MIXED HYPERLIPIDEMIA: ICD-10-CM

## 2022-07-25 RX ORDER — SEMAGLUTIDE 1.34 MG/ML
INJECTION, SOLUTION SUBCUTANEOUS
Qty: 3 BOX | Refills: 1 | Status: SHIPPED | OUTPATIENT
Start: 2022-07-25 | End: 2022-07-26 | Stop reason: CLARIF

## 2022-07-25 RX ORDER — PEN NEEDLE, DIABETIC 30 GX3/16"
NEEDLE, DISPOSABLE MISCELLANEOUS
Qty: 360 EACH | Refills: 3 | Status: SHIPPED | OUTPATIENT
Start: 2022-07-25

## 2022-07-26 ENCOUNTER — TELEPHONE (OUTPATIENT)
Dept: ENDOCRINOLOGY | Age: 52
End: 2022-07-26

## 2022-07-26 RX ORDER — DULAGLUTIDE 0.75 MG/.5ML
0.75 INJECTION, SOLUTION SUBCUTANEOUS
Qty: 6 ML | Refills: 1 | Status: SHIPPED | OUTPATIENT
Start: 2022-07-26 | End: 2022-08-01 | Stop reason: CLARIF

## 2022-07-26 NOTE — PROGRESS NOTES
After he saw the hepatologist, he got the advise to try [place] is excellent medication and I sent the script to the pharmacy       He is now taking prednisone 1 mg a day   My advise is to remember to take Glipizide  when steroids are started     Damaris Patel MD

## 2022-08-01 RX ORDER — FLASH GLUCOSE SENSOR
KIT MISCELLANEOUS
Qty: 2 KIT | Refills: 5 | Status: SHIPPED | OUTPATIENT
Start: 2022-08-01

## 2022-08-02 ENCOUNTER — PATIENT MESSAGE (OUTPATIENT)
Dept: ENDOCRINOLOGY | Age: 52
End: 2022-08-02

## 2022-08-19 DIAGNOSIS — E11.65 TYPE II OR UNSPECIFIED TYPE DIABETES MELLITUS WITH RENAL MANIFESTATIONS, UNCONTROLLED(250.42) (HCC): Primary | ICD-10-CM

## 2022-08-19 DIAGNOSIS — E11.29 TYPE II OR UNSPECIFIED TYPE DIABETES MELLITUS WITH RENAL MANIFESTATIONS, UNCONTROLLED(250.42) (HCC): Primary | ICD-10-CM

## 2022-08-19 RX ORDER — SEMAGLUTIDE 1.34 MG/ML
0.25 INJECTION, SOLUTION SUBCUTANEOUS
Qty: 3 BOX | Refills: 1 | Status: SHIPPED | OUTPATIENT
Start: 2022-08-19

## 2022-08-19 RX ORDER — FLASH GLUCOSE SENSOR
KIT MISCELLANEOUS
Qty: 2 KIT | Refills: 5 | Status: SHIPPED | OUTPATIENT
Start: 2022-08-19

## 2022-08-19 NOTE — TELEPHONE ENCOUNTER
Returned call to patient. He states that his pharmacy does not have a prescription for Trulicity. Advised patient that Ozempic was approved, not Trulicity. Patient also states that he stopped taking glipizide because he was having episodes of hypoglycemia and requests a virtual visit with Dr Char Laird to discuss this. He states that early morning or late evening works best for him. He requests that a prescription for Esthela sensors and Ozempic be sent in to [facility] for him.

## 2022-09-28 ENCOUNTER — OFFICE VISIT (OUTPATIENT)
Dept: ENDOCRINOLOGY | Age: 52
End: 2022-09-28
Payer: COMMERCIAL

## 2022-09-28 VITALS
TEMPERATURE: 97.9 F | WEIGHT: 191 LBS | OXYGEN SATURATION: 98 % | BODY MASS INDEX: 25.87 KG/M2 | HEART RATE: 83 BPM | SYSTOLIC BLOOD PRESSURE: 128 MMHG | DIASTOLIC BLOOD PRESSURE: 81 MMHG | HEIGHT: 72 IN

## 2022-09-28 DIAGNOSIS — E11.65 TYPE II OR UNSPECIFIED TYPE DIABETES MELLITUS WITH RENAL MANIFESTATIONS, UNCONTROLLED(250.42) (HCC): Primary | ICD-10-CM

## 2022-09-28 DIAGNOSIS — E11.29 TYPE II OR UNSPECIFIED TYPE DIABETES MELLITUS WITH RENAL MANIFESTATIONS, UNCONTROLLED(250.42) (HCC): Primary | ICD-10-CM

## 2022-09-28 DIAGNOSIS — E03.9 HYPOTHYROIDISM, UNSPECIFIED TYPE: ICD-10-CM

## 2022-09-28 DIAGNOSIS — E78.2 MIXED HYPERLIPIDEMIA: ICD-10-CM

## 2022-09-28 DIAGNOSIS — D86.9 SARCOIDOSIS: ICD-10-CM

## 2022-09-28 PROCEDURE — 99214 OFFICE O/P EST MOD 30 MIN: CPT | Performed by: INTERNAL MEDICINE

## 2022-09-28 RX ORDER — METFORMIN HYDROCHLORIDE 500 MG/1
500 TABLET, EXTENDED RELEASE ORAL
COMMUNITY
End: 2022-09-28

## 2022-09-28 RX ORDER — INSULIN GLARGINE 100 [IU]/ML
INJECTION, SOLUTION SUBCUTANEOUS
Qty: 15 ML | Refills: 3
Start: 2022-09-28

## 2022-09-28 NOTE — LETTER
10/2/2022    Patient: Sloane Cook   YOB: 1970   Date of Visit: 9/28/2022     Chalo Mckeon, [address]  [address] [facility]  [place] [address]  Via Fax: [phone]    Dear Chalo Mckeon MD,      Thank you for referring Mr. Chrystal Subramanian to [facility] for evaluation. My notes for this consultation are attached. If you have questions, please do not hesitate to call me. I look forward to following your patient along with you.       Sincerely,    Yesi Rangel MD

## 2022-09-28 NOTE — PATIENT INSTRUCTIONS
SPECIFIC INSTRUCTIONS BELOW         DRINK water   Do not skip meals  Do not eat in between meals    Reduce carbs- pasta, rice, potatoes, bread   Try to avoid processed bread products like BISCUITS, CROISSANTS, MUFFINS    Do not drink juices or sodas, even if they are calorie zero or diet drinks and especially avoid using powders like crystalloids , DORA-AIDS     Do not eat peanut butter     Do not eat sugar free cookies and cakes   Do not eat peaches, oranges, pineapples, raisins, grapes , canteloupe , honey dew, mangoes , watermelon  and fruit medleys      -----------------------------------------------------------------------------------------------------------------------      Stay on janumet xr   100/1000 once a day     Start on ozempic 0.25 mg  a week        ----------------------------------------------------------------------------------------------------------------------      Plan  B     Start on lantus  28 units  At bed time  ( stop glipizide  )   OR   Basaglar      novolog   6 units before each meal   OR   Lyeumjev  And    Take additional novolog  as folllows with meals:    150-200 mg 2 units    201-250 mg 4 units    251-300 mg 6 units    301-350 mg 8  units    351-400 mg 10 units    401-450 mg 12  units    451-500 mg 14  units    Less than 70 mg  - no insulin          -------------PAY ATTENTION TO THESE GENERAL INSTRUCTIONS -----------------      - The medications prescribed at this visit will not be available at pharmacy until 6 pm       - YOUR MED LIST IS NOT UP TO DATE AS SOME CHANGES ARE BEING MADE AFTER THE VISIT - FOLLOW SPECIFIC INSTRUCTIONS  ABOVE     -ANY tests other than blood work, which you opt to do  outside the  [facility] imaging facilities, you are responsible for prior authorizations if  required    - [address] [facility]- PLEASE IGNORE     Results     *Normal results will not be notified by a phone call starting January 1 2021   *If you have an upcoming visit, the results will be discussed at the visit   *Please sign up for MY CHART if you want access to your lab and test results  *Abnormal results which require immediate attention will be notified by phone call   *Abnormal results which do not require immediate assistance will be notified in 1-2 weeks       Refills    -    have your pharmacy send us a refill request . Refills are done max for one year and a visit is a must before refills are extended    Follow up appointments -  highly encourage you to make it when you are checking out. We can accommodate you into the schedule based on your clinical situation, but not for extending refills beyond a year. Labs are important to give refills and is important to get labs before the visit     Phone calls  -  Allow  24 hrs.  for non-urgent calls to be returned  Prior authorization - It may take 2-4 weeks to process  Forms  -  FMLA, DMV etc., will take up to 2 weeks to process  Cancellations - please notify the office 2 days in advance   Samples  - will only be dispensed at visits       If not showing for the appointments and cancelling appointments within 24 hours are kept track of and three  of such situations in  two consecutive years will likely be considered for termination from the practice    -------------------------------------------------------------------------------------------------------------------

## 2022-09-28 NOTE — PROGRESS NOTES
HISTORY OF PRESENT ILLNESS  Chrystal Santos is a 45 y.o. male. Follow up visit  After  Last  visit for diabetes, hypothyroidism from june 2022     He discontinued steroids in the month  of   august 2022. He continued to take glipizide and had experienced an episode of hypoglycemia and then he stopped the glipizide. He  is only taking janumet xr . He has NOT started  Ozempic yet ! June 2022     Gained 6 lbs   Now only taking glipizide 5 mg a day  In  AM  After a very low sugar he had during the night time   He moved to [employer]  xr  To dinner   He no longer Is lethargic         MAy 2022     Lost 4 lbs   He had his visit at [facility] sarcoidosis clinic, and was found to have a1c over 12 %   He has noticed weight loss, lethargy   He was tapered to prednisone 5 mg a day from 10 mg a day   He is also anxious about low PLT count         July 2020     Patient here for initial visit of Type 2 diabetes mellitus . 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 He had mediastinoscopy LN biopsy done which showed sarcoidosis  November 2018   During 2019 he treated himself for possible tuberculosis. His chest CT in April 2019 showed non-enlarged mediastinal lymph nodes PET CT scan did show a right supraclavicular lymph node in the mediastinum and adenopathy in jeff hepatitis and portocaval area and some right thyroid nodule with uptake  He then had other imaging studies including a PET/CT which showed presence of enlarged lymph nodes in the central axis.   He had since followed up with the head of sarcoidosis disease at [facility]/pulmonologist.    In the process, he had gotten high-dose steroids which he has taken for 3 months and during that time he was found to have elevated blood sugar/A1c at the primary care physician's office which would fall into January 2020  He got started on metformin. He has been doing his best to minimize the carbohydrates in his diet. He still feels very tired and what he has noticed a significantly is feeling drowsy soon after consumption of carbohydrate meal, and the sugars going over 300 mg  Patient started to notice some atypical chest pains again which led to a cardiac catheterization and it is negative for any blocks. He the EKG showed right bundle branch block. He underwent studies to rule out cardiac sarcoidosis and he also had MRI of the brain which was normal.  Current A1C is     7.4 %    FROM June 2020   and symptoms/problems include  Fatigue and  Feeling sleepy after eating   Current diabetic medications include  METFORMIN . Physical Exam   Constitutional:  oriented to person, place, and time. Fatigued       Patient's myocardial perfusion study done on July 23, 2020 showed good ejection fraction of 74% and a small septal wall defect of 8%  RI from date July 24, 2020 of brain with and without contrast: No abnormal findings except for retention cysts in the maxillary sinuses  Reviewed the PET/CT done on July 24, 2020 to exclude cardiac sarcoidosis  Multiple hypermetabolic bilateral supraclavicular nodes seen ; Hypermetabolic conglomerate of left paratracheal nodes ; Hypermetabolic right paratracheal node  Hypermetabolic subcarinal adenopathy ; Hypermetabolic bilateral hilar lymph nodes right greater than the left ;  Hypermetabolic bilateral axillary lymph nodes  Hypermetabolic paraesophageal and left para-aortic node ; Scattered hypermetabolic foci in the left lower lobe  Focal increased uptake in the medial right lobe of the liver adjacent to the gallbladder and one more in the caudate lobe  There are lymph nodes enlarged in the jeff hepatitis and portacaval area noted mild splenomegaly  Also noted hypermetabolic foci in the right scapular spine and in the subcutaneous tissues of the posterior chest  No cardiac sarcoidosis    Reviewed labs from [facility]   April 2022     ASSESSMENT and PLAN    1. Type 2 DM uncontrolled : A1c   Is   6.7 %   from    august 2022     Compared to  Over 12  %  From [facility]  April 29 2022  compared to    7 %   From sept 2021   Compared to  6.2 %     From    Today   May 2021    Compared   To  July  by POC is 6. 7% compared to 7 %  from January 2020 Sept 2022   improved  glycemic control by [place]ip 2   He is using [place] 2 . Gave a sample of luz maria 3    Stay on janumet xr 100/1000 once a day   He stopped glipizide, when he got off steroids  He will USE   basal bolus regimen as his prednisone  Is used for  sarcoidosis flares a lot       Reviewed   [employer] AGP  report for 14 days and discussed with pt    - 14 day average glucose 144  - GMI   7.2%  - 27% for high ;  2 %  Very high   and 0 %  low sugars and % in Target  Range  71 %    - percent of utilization 67  %  - CV% 26.4%      June 2022     improved  glycemic control by Sandip 2   He is using [place] 2   He is on  Prednisone  5 mg a day   Stay on janumet xr 100/1000 once a day   Stay  On glipizide 5 mg a day  In AM  He will stay on  basal bolus regimen as his prednisone  Is changed for  sarcoidosis flares a lot       Reviewed   Lib[employer] AGP  report for 14 days and discussed with pt    - 14 day average glucose 144  - 18 % for high ;  0 %  Very high   and 0 %  low sugars and % in Target  Range  82 %    - percent of utiization 94  %  - CV% 26.4%        May  2022     Lost glycemic control   He has not been checking sugars   His steroid doses have been increased   He has stopped taking glipizide as he felt hypoglycemic couple of time s  He stayed only on janumet xr   Resuming glipizide 5 mg bid     Pt will start in basal bolus regimen as sugars are high and he has noted blurred vision significantly   Gave him luz maria 2 sample sensors for now       2.  Hypoglycemia :  Educated on treating the hypoglycemia. 3. HTN : Not on meds. 4.  Dyslipidemia : on crestor  And vascepa       5. Hepatic and pulmonary sarcoidisis  : Follows  Dr. Alba Santiago  Pulmonologist , plan Is to taper off prednisone after repeat PET ct scan     7. Subclinical hypothyroidism on  synthroid  -  25 mcg a day      8. Hypogonadism , pituitary  - ? Sarcoidosis   He did nto want to continue on TT as he is afraid of its effect on heart - and liver too   Stopped it  In oct 2020 after 2 doses     9.  Low  PLATELET count - 87 from 78 - improved some       Reviewed results with patient and discussed the labs being ordered today/bnv  Patient voiced understanding of plan of care

## 2022-10-26 ENCOUNTER — TELEPHONE (OUTPATIENT)
Dept: ENDOCRINOLOGY | Age: 52
End: 2022-10-26

## 2022-10-26 DIAGNOSIS — E11.65 TYPE II OR UNSPECIFIED TYPE DIABETES MELLITUS WITH RENAL MANIFESTATIONS, UNCONTROLLED(250.42) (HCC): Primary | ICD-10-CM

## 2022-10-26 DIAGNOSIS — E11.29 TYPE II OR UNSPECIFIED TYPE DIABETES MELLITUS WITH RENAL MANIFESTATIONS, UNCONTROLLED(250.42) (HCC): Primary | ICD-10-CM

## 2022-10-26 NOTE — TELEPHONE ENCOUNTER
Patient would like a new script sent for the freestyle luz maria 3. I did explain to him that if his insurance needs prior auth, it will take at least 7-10 business days.  Thank you

## 2022-10-27 RX ORDER — BLOOD-GLUCOSE SENSOR
EACH MISCELLANEOUS
Qty: 2 KIT | Refills: 6 | Status: SHIPPED | OUTPATIENT
Start: 2022-10-27 | End: 2022-11-23 | Stop reason: SDUPTHER

## 2022-10-27 NOTE — TELEPHONE ENCOUNTER
Requested Prescriptions     Signed Prescriptions Disp Refills    flash glucose sensor (FreeStyle Esthela 3 Sensor) kit 2 Kit 6     Sig: Use as directed for continuous glucose monitoring     Authorizing Provider: Candace Carrero     Ordering User: Nae Duran     Verbal order read back to Dr Cyndi Kang to send refill.

## 2022-11-23 DIAGNOSIS — E11.29 TYPE II OR UNSPECIFIED TYPE DIABETES MELLITUS WITH RENAL MANIFESTATIONS, UNCONTROLLED(250.42) (HCC): ICD-10-CM

## 2022-11-23 DIAGNOSIS — E11.65 TYPE II OR UNSPECIFIED TYPE DIABETES MELLITUS WITH RENAL MANIFESTATIONS, UNCONTROLLED(250.42) (HCC): ICD-10-CM

## 2022-11-23 RX ORDER — BLOOD-GLUCOSE SENSOR
EACH MISCELLANEOUS
Qty: 6 KIT | Refills: 3 | Status: SHIPPED | OUTPATIENT
Start: 2022-11-23

## 2022-11-23 NOTE — TELEPHONE ENCOUNTER
Requested Prescriptions     Signed Prescriptions Disp Refills    flash glucose sensor (FreeStyle Esthela 3 Sensor) kit 6 Kit 3     Sig: Use as directed for continuous glucose monitoring     Authorizing Provider: Don Underwood     Ordering User: Rodolfo Wellington     Verbal order read back to Dr Regino Soto to send refill.

## 2022-12-12 ENCOUNTER — TELEPHONE (OUTPATIENT)
Dept: ENDOCRINOLOGY | Age: 52
End: 2022-12-12

## 2022-12-12 NOTE — TELEPHONE ENCOUNTER
Patient called would like you to order his labs stated you would know what that means. Please call when done.

## 2022-12-14 DIAGNOSIS — E11.65 TYPE II OR UNSPECIFIED TYPE DIABETES MELLITUS WITH RENAL MANIFESTATIONS, UNCONTROLLED(250.42) (HCC): Primary | ICD-10-CM

## 2022-12-14 DIAGNOSIS — E11.29 TYPE II OR UNSPECIFIED TYPE DIABETES MELLITUS WITH RENAL MANIFESTATIONS, UNCONTROLLED(250.42) (HCC): Primary | ICD-10-CM

## 2022-12-14 RX ORDER — SEMAGLUTIDE 1.34 MG/ML
INJECTION, SOLUTION SUBCUTANEOUS
Qty: 2 BOX | Refills: 0 | Status: SHIPPED | COMMUNITY
Start: 2022-12-14

## 2023-03-13 ENCOUNTER — TRANSCRIBE ORDER (OUTPATIENT)
Dept: SCHEDULING | Age: 53
End: 2023-03-13

## 2023-03-30 ENCOUNTER — OFFICE VISIT (OUTPATIENT)
Dept: ENDOCRINOLOGY | Age: 53
End: 2023-03-30

## 2023-03-30 VITALS
HEART RATE: 77 BPM | WEIGHT: 190 LBS | TEMPERATURE: 96.9 F | OXYGEN SATURATION: 96 % | DIASTOLIC BLOOD PRESSURE: 77 MMHG | BODY MASS INDEX: 25.73 KG/M2 | HEIGHT: 72 IN | SYSTOLIC BLOOD PRESSURE: 131 MMHG

## 2023-03-30 DIAGNOSIS — E11.65 TYPE II OR UNSPECIFIED TYPE DIABETES MELLITUS WITH RENAL MANIFESTATIONS, UNCONTROLLED(250.42) (HCC): ICD-10-CM

## 2023-03-30 DIAGNOSIS — E11.29 TYPE II OR UNSPECIFIED TYPE DIABETES MELLITUS WITH RENAL MANIFESTATIONS, UNCONTROLLED(250.42) (HCC): Primary | ICD-10-CM

## 2023-03-30 DIAGNOSIS — D86.9 SARCOIDOSIS: ICD-10-CM

## 2023-03-30 DIAGNOSIS — E11.29 TYPE II OR UNSPECIFIED TYPE DIABETES MELLITUS WITH RENAL MANIFESTATIONS, UNCONTROLLED(250.42) (HCC): ICD-10-CM

## 2023-03-30 DIAGNOSIS — E03.9 HYPOTHYROIDISM, UNSPECIFIED TYPE: ICD-10-CM

## 2023-03-30 DIAGNOSIS — E78.2 MIXED HYPERLIPIDEMIA: ICD-10-CM

## 2023-03-30 DIAGNOSIS — E11.65 TYPE II OR UNSPECIFIED TYPE DIABETES MELLITUS WITH RENAL MANIFESTATIONS, UNCONTROLLED(250.42) (HCC): Primary | ICD-10-CM

## 2023-03-30 RX ORDER — BLOOD-GLUCOSE SENSOR
EACH MISCELLANEOUS
Qty: 6 EACH | Refills: 3 | Status: SHIPPED | OUTPATIENT
Start: 2023-03-30

## 2023-03-30 RX ORDER — ICOSAPENT ETHYL 1000 MG/1
1 CAPSULE ORAL 2 TIMES DAILY WITH MEALS
Qty: 180 CAPSULE | Refills: 3 | Status: SHIPPED | OUTPATIENT
Start: 2023-03-30

## 2023-03-30 NOTE — LETTER
3/31/2023    Patient: Adonis Manuel   YOB: 1970   Date of Visit: 3/30/2023     Silas Lewis, [address]  [address] [address]  Via Fax: [phone]    Dear Silas Lewis MD,      Thank you for referring Mr. Chrystal Subramanian to [facility] for evaluation. My notes for this consultation are attached. If you have questions, please do not hesitate to call me. I look forward to following your patient along with you.       Sincerely,    Laureen Shaikh MD

## 2023-03-30 NOTE — PROGRESS NOTES
Sofia Sharma MD FACE       HISTORY OF PRESENT ILLNESS  Marylee Lora Derrel Market is a 45 y.o. male. Follow up visit  After  Last  visit for diabetes, hypothyroidism from Sept 2022   He is compliant with ozempic and janumet xr   He has not needed steroids   His PET CT came clean for  sarcoidosis     He feels healthy   He changed to  [place] 3       Sept 2022    He discontinued steroids in the month  of   august 2022. He continued to take glipizide and had experienced an episode of hypoglycemia and then he stopped the glipizide. He  is only taking janumet xr . He has NOT started  Ozempic yet ! June 2022     Gained 6 lbs   Now only taking glipizide 5 mg a day  In  AM  After a very low sugar he had during the night time   He moved to [employer]  xr  To dinner   He no longer Is lethargic         MAy 2022     Lost 4 lbs   He had his visit at [facility] sarcoidosis clinic, and was found to have a1c over 12 %   He has noticed weight loss, lethargy   He was tapered to prednisone 5 mg a day from 10 mg a day   He is also anxious about low PLT count         July 2020     Patient here for initial visit of Type 2 diabetes mellitus . 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 He had mediastinoscopy LN biopsy done which showed sarcoidosis  November 2018   During 2019 he treated himself for possible tuberculosis.   His chest CT in April 2019 showed non-enlarged mediastinal lymph nodes PET CT scan did show a right supraclavicular lymph node in the mediastinum and adenopathy in jeff hepatitis and portocaval area and some right thyroid nodule with uptake  He then had other imaging studies including a PET/CT which showed presence of enlarged lymph nodes in the central axis. He had since followed up with the head of sarcoidosis disease at [facility]/pulmonologist.    In the process, he had gotten high-dose steroids which he has taken for 3 months and during that time he was found to have elevated blood sugar/A1c at the primary care physician's office which would fall into January 2020  He got started on metformin. He has been doing his best to minimize the carbohydrates in his diet. He still feels very tired and what he has noticed a significantly is feeling drowsy soon after consumption of carbohydrate meal, and the sugars going over 300 mg  Patient started to notice some atypical chest pains again which led to a cardiac catheterization and it is negative for any blocks. He the EKG showed right bundle branch block. He underwent studies to rule out cardiac sarcoidosis and he also had MRI of the brain which was normal.  Current A1C is     7.4 %    FROM June 2020   and symptoms/problems include  Fatigue and  Feeling sleepy after eating   Current diabetic medications include  METFORMIN . ROS : none    Physical Exam   Constitutional:  oriented to person, place, and time. Patient's myocardial perfusion study done on July 23, 2020 showed good ejection fraction of 74% and a small septal wall defect of 8%  RI from date July 24, 2020 of brain with and without contrast: No abnormal findings except for retention cysts in the maxillary sinuses  Reviewed the PET/CT done on July 24, 2020 to exclude cardiac sarcoidosis  Multiple hypermetabolic bilateral supraclavicular nodes seen ; Hypermetabolic conglomerate of left paratracheal nodes ; Hypermetabolic right paratracheal node  Hypermetabolic subcarinal adenopathy ; Hypermetabolic bilateral hilar lymph nodes right greater than the left ;  Hypermetabolic bilateral axillary lymph nodes  Hypermetabolic paraesophageal and left para-aortic node ; Scattered hypermetabolic foci in the left lower lobe  Focal increased uptake in the medial right lobe of the liver adjacent to the gallbladder and one more in the caudate lobe  There are lymph nodes enlarged in the jeff hepatitis and portacaval area noted mild splenomegaly  Also noted hypermetabolic foci in the right scapular spine and in the subcutaneous tissues of the posterior chest, no cardiac sarcoidosis     Reviewed labs from [facility]   April 2022     ASSESSMENT and PLAN    1. Type 2 DM uncontrolled : A1c   Is   ? No labs   compared to   6.7 %   from    august 2022     Compared to  Over 12  %  From [facility]  April 29 2022  compared to    7 %   From sept 2021   Compared to  6.2 %     From    Today   May 2021    Compared   To  July  by POC is 6. 7% compared to 7 %  from January 2020 March 2023       Stay on janumet xr 100/1000 once a day   and ozempic     Reviewed   Lib[employer] AGP  report for 14 days and discussed with pt    - 14 day average glucose 152   - GMI   6.9 %  - 27% for high ;  2 %  Very high   and 0 %  low sugars and % in Target  Range  83  %    - percent of utilization 92  %  - CV% 21.5  %      Sept 2022   improved  glycemic control by Harvey [place] 2   He is using luz maria 2 . Gave a sample of luz maria 3    Stay on janumet xr 100/1000 once a day   He stopped glipizide, when he got off steroids  He will USE   basal bolus regimen as his prednisone  Is used for  sarcoidosis flares a lot       Reviewed   Lib[employer] AGP  report for 14 days and discussed with pt    - 14 day average glucose 144  - GMI   7.2%  - 27% for high ;  2 %  Very high   and 0 %  low sugars and % in Target  Range  71 %    - percent of utilization 67  %  - CV% 26.4%      2. Hypoglycemia :  Educated on treating the hypoglycemia. 3. HTN : Not on meds. 4.  Dyslipidemia : on crestor  And vascepa   He had cardiac cath [facility]  and  he was found to have mild CAD       5. Hepatic and pulmonary sarcoidisis  : Follows  Dr. Maria Esther Casiano  Pulmonologist , clear  PET ct scan     7.   Subclinical hypothyroidism on  synthroid  -  25 mcg a day      8. Hypogonadism , pituitary  - ? Sarcoidosis   He did nto want to continue on TT as he is afraid of its effect on heart - and liver too   Stopped it  In oct 2020 after 2 doses     9.  Low  PLATELET count - 87 from 78 - improved some       Reviewed results with patient and discussed the labs being ordered today/bnv  Patient voiced understanding of plan of care

## 2023-03-30 NOTE — PROGRESS NOTES
Lashay Hirsch is a 45 y.o. male here for   Chief Complaint   Patient presents with    Diabetes    Thyroid Problem       1. Have you been to the ER, urgent care clinic since your last visit? Hospitalized since your last visit? - No    2. Have you seen or consulted any other health care providers outside of the [address] [place] [address] since your last visit?   Include any pap smears or colon screening.- No

## 2023-03-30 NOTE — PATIENT INSTRUCTIONS
SPECIFIC INSTRUCTIONS BELOW       DRINK water   Do not skip meals  Do not eat in between meals    Reduce carbs- pasta, rice, potatoes, bread   Try to avoid processed bread products like BISCUITS, CROISSANTS, MUFFINS    Do not drink juices or sodas, even if they are calorie zero or diet drinks and especially avoid using powders like crystalloids , DORA-AIDS     Do not eat peanut butter     Do not eat sugar free cookies and cakes   Do not eat peaches, oranges, pineapples, raisins, grapes , canteloupe , honey dew, mangoes , watermelon  and fruit medleys      -----------------------------------------------------------------------------------------------------------------------      Stay on janumet xr   100/1000 once a day     Increase  to  ozempic 0.5 mg  a week        ----------------------------------------------------------------------------------------------------------------------      Plan  B      lantus  28 units  At bed time  ( stop glipizide  )       novolog   6 units before each meal   OR   Lyeumjev  And    Take additional novolog  as folllows with meals:    150-200 mg 2 units    201-250 mg 4 units    251-300 mg 6 units    301-350 mg 8  units    351-400 mg 10 units    401-450 mg 12  units    451-500 mg 14  units    Less than 70 mg  - no insulin            -------------PAY ATTENTION TO THESE GENERAL INSTRUCTIONS -----------------      - The medications prescribed at this visit will not be available at pharmacy until 6 pm       - YOUR MED LIST IS NOT UP TO DATE AS SOME CHANGES ARE BEING MADE AFTER THE VISIT - FOLLOW SPECIFIC INSTRUCTIONS  ABOVE     -ANY tests other than blood work, which you opt to do  outside the  [facility] imaging facilities, you are responsible for prior authorizations if  required    - [phone] [facility]- PLEASE IGNORE     Results     *Normal results will not be notified by a phone call starting January 1 2021   *If you have an upcoming visit, the results will be discussed at the visit   *Please sign up for MY CHART if you want access to your lab and test results  *Abnormal results which require immediate attention will be notified by phone call   *Abnormal results which do not require immediate assistance will be notified in 1-2 weeks       Refills    -    have your pharmacy send us a refill request . Refills are done max for one year and a visit is a must before refills are extended    Follow up appointments -  highly encourage you to make it when you are checking out. We can accommodate you into the schedule based on your clinical situation, but not for extending refills beyond a year. Labs are important to give refills and is important to get labs before the visit     Phone calls  -  Allow  24 hrs.  for non-urgent calls to be returned  Prior authorization - It may take 2-4 weeks to process  Forms  -  FMLA, DMV etc., will take up to 2 weeks to process  Cancellations - please notify the office 2 days in advance   Samples  - will only be dispensed at visits       If not showing for the appointments and cancelling appointments within 24 hours are kept track of and three  of such situations in  two consecutive years will likely be considered for termination from the practice    -------------------------------------------------------------------------------------------------------------------

## 2023-04-21 RX ORDER — LEVOTHYROXINE SODIUM 25 UG/1
TABLET ORAL
Qty: 90 TABLET | Refills: 3 | Status: SHIPPED | OUTPATIENT
Start: 2023-04-21

## 2023-06-30 DIAGNOSIS — E11.29 TYPE 2 DIABETES MELLITUS WITH OTHER DIABETIC KIDNEY COMPLICATION (HCC): Primary | ICD-10-CM

## 2023-06-30 DIAGNOSIS — E78.2 MIXED HYPERLIPIDEMIA: ICD-10-CM

## 2023-06-30 RX ORDER — LEVOTHYROXINE SODIUM 0.03 MG/1
25 TABLET ORAL
Qty: 90 TABLET | Refills: 3 | Status: SHIPPED | OUTPATIENT
Start: 2023-06-30

## 2023-06-30 RX ORDER — SEMAGLUTIDE 1.34 MG/ML
INJECTION, SOLUTION SUBCUTANEOUS
Qty: 9 ML | Refills: 3 | Status: SHIPPED | OUTPATIENT
Start: 2023-06-30

## 2023-06-30 RX ORDER — ICOSAPENT ETHYL 1000 MG/1
1 CAPSULE ORAL 2 TIMES DAILY WITH MEALS
Qty: 180 CAPSULE | Refills: 3 | Status: SHIPPED | OUTPATIENT
Start: 2023-06-30

## 2023-08-02 ENCOUNTER — TELEPHONE (OUTPATIENT)
Age: 53
End: 2023-08-02

## 2023-08-02 DIAGNOSIS — E11.29 TYPE 2 DIABETES MELLITUS WITH OTHER DIABETIC KIDNEY COMPLICATION (HCC): Primary | ICD-10-CM

## 2023-08-02 NOTE — TELEPHONE ENCOUNTER
Attempted to call. Unsuccessful. Left msg for Apryl Jeronimo to give us a call back at the office. A callback number was left.

## 2023-08-03 ENCOUNTER — OFFICE VISIT (OUTPATIENT)
Age: 53
End: 2023-08-03
Payer: COMMERCIAL

## 2023-08-03 VITALS
SYSTOLIC BLOOD PRESSURE: 102 MMHG | BODY MASS INDEX: 25.77 KG/M2 | RESPIRATION RATE: 18 BRPM | DIASTOLIC BLOOD PRESSURE: 65 MMHG | HEIGHT: 72 IN | OXYGEN SATURATION: 97 % | TEMPERATURE: 97.1 F | HEART RATE: 74 BPM

## 2023-08-03 DIAGNOSIS — E06.3 HYPOTHYROIDISM DUE TO HASHIMOTO'S THYROIDITIS: ICD-10-CM

## 2023-08-03 DIAGNOSIS — E03.8 HYPOTHYROIDISM DUE TO HASHIMOTO'S THYROIDITIS: ICD-10-CM

## 2023-08-03 DIAGNOSIS — E11.29 TYPE 2 DIABETES MELLITUS WITH OTHER DIABETIC KIDNEY COMPLICATION (HCC): Primary | ICD-10-CM

## 2023-08-03 DIAGNOSIS — E78.2 MIXED HYPERLIPIDEMIA: ICD-10-CM

## 2023-08-03 DIAGNOSIS — D86.9 SARCOIDOSIS, UNSPECIFIED: ICD-10-CM

## 2023-08-03 PROCEDURE — 99214 OFFICE O/P EST MOD 30 MIN: CPT | Performed by: INTERNAL MEDICINE

## 2023-08-03 PROCEDURE — 3044F HG A1C LEVEL LT 7.0%: CPT | Performed by: INTERNAL MEDICINE

## 2023-08-03 RX ORDER — METFORMIN HYDROCHLORIDE 750 MG/1
TABLET, EXTENDED RELEASE ORAL
Qty: 90 TABLET | Refills: 3 | Status: SHIPPED | OUTPATIENT
Start: 2023-08-03

## 2023-08-03 RX ORDER — BLOOD-GLUCOSE SENSOR
EACH MISCELLANEOUS
Qty: 6 EACH | Refills: 3 | Status: SHIPPED | OUTPATIENT
Start: 2023-08-03

## 2023-08-03 RX ORDER — SEMAGLUTIDE 1.34 MG/ML
INJECTION, SOLUTION SUBCUTANEOUS
Qty: 9 ML | Refills: 3 | Status: SHIPPED | OUTPATIENT
Start: 2023-08-03

## 2023-08-03 RX ORDER — SEMAGLUTIDE 0.68 MG/ML
INJECTION, SOLUTION SUBCUTANEOUS
Qty: 3 ML | Refills: 0 | Status: SHIPPED | COMMUNITY
Start: 2023-08-03

## 2023-08-03 RX ORDER — SITAGLIPTIN AND METFORMIN HYDROCHLORIDE 1000; 100 MG/1; MG/1
TABLET, FILM COATED, EXTENDED RELEASE ORAL
Qty: 90 TABLET | Refills: 3 | OUTPATIENT
Start: 2023-08-03

## 2023-08-03 NOTE — PROGRESS NOTES
Lino Palma is a 52 y.o. male here for   Chief Complaint   Patient presents with    Diabetes       1. Have you been to the ER, urgent care clinic since your last visit? Hospitalized since your last visit? -no    2. Have you seen or consulted any other health care providers outside of the [address] [place] [address] Avenue since your last visit?   Include any pap smears or colon screening.-no

## 2023-08-03 NOTE — PATIENT INSTRUCTIONS
SPECIFIC INSTRUCTIONS BELOW             -------------PAY ATTENTION TO THESE GENERAL INSTRUCTIONS -----------------      - The medications prescribed at this visit will not be available at pharmacy until 6 pm       - YOUR MED LIST IS NOT UP TO DATE AS SOME CHANGES ARE BEING MADE AFTER THE VISIT - FOLLOW SPECIFIC INSTRUCTIONS  ABOVE     -ANY tests other than blood work, which you opt to do  outside the  [facility] imaging facilities, you are responsible for prior authorizations if  required    - [address] [place] [address] Drive AVS- PLEASE IGNORE     Results     *Normal results will not be notified by a phone call starting January 1 2021   *If you have an upcoming visit, the results will be discussed at the visit   *Please sign up for MY CHART if you want access to your lab and test results  *Abnormal results which require immediate attention will be notified by phone call   *Abnormal results which do not require immediate assistance will be notified in 1-2 weeks       Refills    -    have your pharmacy send us a refill request . Refills are done max for one year and a visit is a must before refills are extended    Follow up appointments -  highly encourage you to make it when you are checking out. We can accommodate you into the schedule based on your clinical situation, but not for extending refills beyond a year. Labs are important to give refills and is important to get labs before the visit     Phone calls  -  Allow  24 hrs.  for non-urgent calls to be returned  Prior authorization - It may take 2-4 weeks to process  Forms  -  FMLA, DMV etc., will take up to 2 weeks to process  Cancellations - please notify the office 2 days in advance   Samples  - will only be dispensed at visits       If not showing for the appointments and cancelling appointments within 24 hours are kept track of and three  of such situations in  two consecutive years will likely be considered for termination from the

## 2023-08-03 NOTE — PROGRESS NOTES
Bibi David MD FACE          HISTORY OF PRESENT ILLNESS   Wu Leon is a 52  y.o.  male. 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 He had mediastinoscopy LN biopsy done which showed sarcoidosis  November 2018    During 2019 he treated himself for possible tuberculosis. His chest CT in April 2019 showed non-enlarged mediastinal lymph nodes PET CT scan did show a right supraclavicular lymph node in the mediastinum and adenopathy in nitin hepatitis and portocaval  area and some right thyroid nodule with uptake   He then had other imaging studies including a PET/CT which showed presence of enlarged lymph nodes in the central axis. He had since followed up with the head of sarcoidosis disease at [facility]/pulmonologist.      In the process, he had gotten high-dose steroids which he has taken for 3 months and during that time he was found to have elevated blood sugar/A1c at the primary care physician's office which would fall into January 2020   He got started on metformin. He has been doing his best to minimize the carbohydrates in his diet. He still feels very tired and what he has noticed a significantly is feeling drowsy soon after consumption of carbohydrate meal, and the sugars going over 300 mg   Patient started to notice some atypical chest pains again which led to a cardiac catheterization and it is negative for any blocks. He the EKG showed right bundle branch block.    He underwent studies to rule out cardiac sarcoidosis and he also had MRI of the brain which was normal.   Current A1C is     7.4 %    FROM June 2020   and symptoms/problems

## 2024-04-25 ENCOUNTER — OFFICE VISIT (OUTPATIENT)
Age: 54
End: 2024-04-25
Payer: COMMERCIAL

## 2024-04-25 VITALS
DIASTOLIC BLOOD PRESSURE: 73 MMHG | WEIGHT: 181.6 LBS | TEMPERATURE: 97.9 F | HEART RATE: 71 BPM | BODY MASS INDEX: 24.6 KG/M2 | HEIGHT: 72 IN | OXYGEN SATURATION: 97 % | SYSTOLIC BLOOD PRESSURE: 119 MMHG

## 2024-04-25 DIAGNOSIS — Z12.5 PROSTATE CANCER SCREENING: ICD-10-CM

## 2024-04-25 DIAGNOSIS — E11.29 TYPE 2 DIABETES MELLITUS WITH OTHER DIABETIC KIDNEY COMPLICATION (HCC): Primary | ICD-10-CM

## 2024-04-25 DIAGNOSIS — D86.9 SARCOIDOSIS, UNSPECIFIED: ICD-10-CM

## 2024-04-25 DIAGNOSIS — D69.6 THROMBOCYTOPENIA (HCC): ICD-10-CM

## 2024-04-25 DIAGNOSIS — E03.8 HYPOTHYROIDISM DUE TO HASHIMOTO'S THYROIDITIS: ICD-10-CM

## 2024-04-25 DIAGNOSIS — E06.3 HYPOTHYROIDISM DUE TO HASHIMOTO'S THYROIDITIS: ICD-10-CM

## 2024-04-25 DIAGNOSIS — E78.2 MIXED HYPERLIPIDEMIA: ICD-10-CM

## 2024-04-25 PROCEDURE — 99214 OFFICE O/P EST MOD 30 MIN: CPT | Performed by: INTERNAL MEDICINE

## 2024-04-25 RX ORDER — LEVOTHYROXINE SODIUM 0.03 MG/1
25 TABLET ORAL
Qty: 90 TABLET | Refills: 3 | Status: SHIPPED | OUTPATIENT
Start: 2024-04-25

## 2024-04-25 RX ORDER — METFORMIN HYDROCHLORIDE 750 MG/1
TABLET, EXTENDED RELEASE ORAL
Qty: 180 TABLET | Refills: 1
Start: 2024-04-25

## 2024-04-25 RX ORDER — ICOSAPENT ETHYL 1000 MG/1
1 CAPSULE ORAL 2 TIMES DAILY WITH MEALS
Qty: 180 CAPSULE | Refills: 3 | Status: SHIPPED | OUTPATIENT
Start: 2024-04-25

## 2024-04-25 RX ORDER — ROSUVASTATIN CALCIUM 10 MG/1
10 TABLET, COATED ORAL NIGHTLY
Qty: 90 TABLET | Refills: 3 | Status: SHIPPED | OUTPATIENT
Start: 2024-04-25

## 2024-04-25 NOTE — PROGRESS NOTES
week)  On  metformin sr 750 mg  once  a day , plans to increase to bid   ( Lantus and novolog kep on reserve if steroids begin )     ? Will pend the thought of  jardiance   Reviewed   Libreview AGP  report for 14 days and discussed with pt     - 14 day average glucose 160     - GMI   7.1  %   - 25 % for high ;  2 %  Very high   and 0 %  low sugars and % in Target  Range  73   %     - percent of utilization 92  %   - CV% 24 .5  %      August 2023     Await new labs   Agreeing with restart of Ozempic , but he stays @ 0.5 mg dose only ( prescription is still kept at 1 mg a week)  Changing janumet xr  to metformin sr 750 mg a day     ? Will pend the thought of  jardiance until the findings on MRI  for kidneys is clarified with nephro     Reviewed   Libreview AGP  report for 14 days and discussed with pt     - 14 day average glucose 153    - GMI   7 %   - 17% for high ;  2 %  Very high   and 0 %  low sugars and % in Target  Range  81  %     - percent of utilization 92  %   - CV% 21.5  %          2. Hypoglycemia :  Educated on treating the hypoglycemia.       3. HTN : Not on meds.       4.  Dyslipidemia : on crestor  And vascepa   Was considering  repatha q monthly ;  He had cardiac cath @ [facility]  and  he was found to have mild CAD          5. Hepatic and pulmonary sarcoidisis  : Follows  Dr. Levy , [facility]  Pulmonologist , clear  PET ct scan       7.  Subclinical hypothyroidism on levothyroxine  @ 25 mcg a day        8.  Hypogonadism , pituitary  - ? Sarcoidosis :  He did nto want to continue on TT as he is afraid of its effect on heart - and liver too ;  Stopped it  In oct 2020 after 2 doses      9. Low  PLATELET count - 87 from 78 - improved some ( old labs )    10. New diagnosis of  lichen planus - on skin  , using steroid cream      Labs 2 sets printed for this visit and next time          Reviewed results with patient and discussed the labs being ordered today/bnv   Patient voiced understanding of plan of care

## 2024-04-25 NOTE — PATIENT INSTRUCTIONS
SPECIFIC INSTRUCTIONS BELOW     On Ozempic  1 mg pens   ( at  36 clicks weekly )     Metformin er 750  mg twice a day       Levothyroxine  25  mcg  A day, on empty stomach with water only, no other meds or food or drinks   For next half hour   Take any kind of vitamins, calcium, iron   Pills  4 hours later      -------------PAY ATTENTION TO THESE GENERAL INSTRUCTIONS -----------------      - The medications prescribed at this visit will not be available at pharmacy until 6 pm today        - your med list is not updated until the visit encounter is closed, so FOLLOW THE TYPED SPECIFIC INSTRUCTIONS  ABOVE     -ANY tests other than blood work, which you opt to do  outside the  [facility] imaging facilities, you are responsible for prior authorizations if  required    - health maintenance will not be updated  on AVS, so please ignore it       Results     *Normal results will not be notified by a phone call starting January 1 2024   *If you have an upcoming visit, the results will be discussed at the visit   *Please sign up for MY CHART if you want access to your lab and test results  *Abnormal results which require immediate attention will be notified by phone call   *Abnormal results which do not require immediate assistance will be notified in 1-2 weeks       Refills    -    have your pharmacy send us a refill request . Refills are done max for one year and a visit is a must before refills are extended    Follow up appointments -  highly encourage you to make it when you are checking out. We can accommodate you into the schedule based on your clinical situation, but not for extending refills beyond a year. Labs are important to give refills and it is important to get labs before the visit     Phone calls  -  Allow  24 hrs. for non-urgent calls to be returned  Prior authorization - It may take 2 to 4 weeks to process  Forms  -  FMLA, DMV etc., will take up to 2 weeks to process  Cancellations - please notify the

## 2024-08-29 DIAGNOSIS — E11.29 TYPE 2 DIABETES MELLITUS WITH OTHER DIABETIC KIDNEY COMPLICATION (HCC): Primary | ICD-10-CM

## 2024-08-29 RX ORDER — BLOOD-GLUCOSE SENSOR
EACH MISCELLANEOUS
Qty: 6 EACH | Refills: 3 | Status: SHIPPED | OUTPATIENT
Start: 2024-08-29

## 2024-10-28 ENCOUNTER — OFFICE VISIT (OUTPATIENT)
Age: 54
End: 2024-10-28
Payer: COMMERCIAL

## 2024-10-28 VITALS
HEIGHT: 72 IN | TEMPERATURE: 97.6 F | WEIGHT: 180.8 LBS | OXYGEN SATURATION: 96 % | HEART RATE: 64 BPM | DIASTOLIC BLOOD PRESSURE: 67 MMHG | SYSTOLIC BLOOD PRESSURE: 106 MMHG | BODY MASS INDEX: 24.49 KG/M2

## 2024-10-28 DIAGNOSIS — D69.6 THROMBOCYTOPENIA (HCC): Primary | ICD-10-CM

## 2024-10-28 DIAGNOSIS — E06.3 HYPOTHYROIDISM DUE TO HASHIMOTO'S THYROIDITIS: ICD-10-CM

## 2024-10-28 DIAGNOSIS — E11.29 TYPE 2 DIABETES MELLITUS WITH OTHER DIABETIC KIDNEY COMPLICATION (HCC): Primary | ICD-10-CM

## 2024-10-28 DIAGNOSIS — E78.2 MIXED HYPERLIPIDEMIA: ICD-10-CM

## 2024-10-28 DIAGNOSIS — E11.29 TYPE 2 DIABETES MELLITUS WITH OTHER DIABETIC KIDNEY COMPLICATION (HCC): ICD-10-CM

## 2024-10-28 DIAGNOSIS — D69.6 THROMBOCYTOPENIA (HCC): ICD-10-CM

## 2024-10-28 PROCEDURE — 99213 OFFICE O/P EST LOW 20 MIN: CPT | Performed by: INTERNAL MEDICINE

## 2024-10-28 RX ORDER — LEVOTHYROXINE SODIUM 25 UG/1
25 TABLET ORAL
Qty: 90 TABLET | Refills: 3 | Status: SHIPPED | OUTPATIENT
Start: 2024-10-28

## 2024-10-28 RX ORDER — BLOOD-GLUCOSE SENSOR
EACH MISCELLANEOUS
Qty: 6 EACH | Refills: 3 | Status: SHIPPED | OUTPATIENT
Start: 2024-10-28

## 2024-10-28 RX ORDER — ICOSAPENT ETHYL 1 G/1
1 CAPSULE ORAL 2 TIMES DAILY WITH MEALS
Qty: 180 CAPSULE | Refills: 3 | Status: SHIPPED | OUTPATIENT
Start: 2024-10-28

## 2024-10-28 RX ORDER — METFORMIN HYDROCHLORIDE 750 MG/1
TABLET, EXTENDED RELEASE ORAL
Qty: 180 TABLET | Refills: 1 | Status: SHIPPED | OUTPATIENT
Start: 2024-10-28

## 2024-10-28 NOTE — PROGRESS NOTES
[facility] DIABETES AND ENDOCRINOLOGY                Danni Kearns MD FACE      HISTORY OF PRESENT ILLNESS   Apryl Jeronimo is a 54   y.o.  male.      Follow up visit  after  Last  visit for diabetes 2 , hypothyroidism,  HPL , sarcoidosis, hypogonadism    from last visit   April 2024       Lost 1 lb   Pt is  tolerating  lesser doses of Ozempic because of contipation   Had a visit with [facility],   was suggested  to take coreg for   postal hypertension   He has not gotten the med yet  !     He  had MRI  of liver  at  [facility]       April 2024    He is looking healthy and happy   Recent diagnosis of lichen planus of skin  Could not tolerate 1 mg ozempic dose , so takes half the dial  He is using christian   Unable to handle sugars around 70 mg ( hopes to have better A1c )         August 2023        He had increased  Ozempic dose to 1 mg a week  roughly April 2023 , GI asked him to increase it   Suddenly, one day he felt severely nauseous and he threw up   Discussed with GI  and stopped ozempic   He has been off  of it  for 2 weeks, feeling whole lot better   Able to exercise   He is eager to resume ozempic too,  because his MRI liver showed no hepatic steatosis and he is thrilled with this change   Also, he is hoping to be off AZT soon   Asking if he will need jardiance   Right now taking only janumet xr ( he reported taking half pill of janumet xr )        March 2023    He is compliant with ozempic and janumet xr    He has not needed steroids    His PET CT came clean for  sarcoidosis     He feels healthy    He changed to  Christian 3      Initial visit history July 2020   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   He had mediastinoscopy LN biopsy done which showed sarcoidosis  November 2018    During 2019 he treated himself for possible

## 2024-10-28 NOTE — PATIENT INSTRUCTIONS
SPECIFIC INSTRUCTIONS BELOW     On Ozempic  1 mg pens   ( at  36 clicks weekly )     Metformin er 750  mg once a day       Levothyroxine  25  mcg  A day, on empty stomach with water only, no other meds or food or drinks   For next half hour   Take any kind of vitamins, calcium, iron   Pills  4 hours later      -------------PAY ATTENTION TO THESE GENERAL INSTRUCTIONS -----------------      - The medications prescribed at this visit will not be available at pharmacy until 6 pm today        - your med list is not updated until the visit encounter is closed, so FOLLOW THE TYPED SPECIFIC INSTRUCTIONS  ABOVE     -ANY tests other than blood work, which you opt to do  outside the  [facility] imaging facilities, you are responsible for prior authorizations if  required    - health maintenance will not be updated  on AVS, so please ignore it       Results     *Normal results will not be notified by a phone call starting January 1 2024   *If you have an upcoming visit, the results will be discussed at the visit   *Please sign up for MY CHART if you want access to your lab and test results  *Abnormal results which require immediate attention will be notified by phone call   *Abnormal results which do not require immediate assistance will be notified in 1-2 weeks       Refills    -    have your pharmacy send us a refill request . Refills are done max for one year and a visit is a must before refills are extended    Follow up appointments -  highly encourage you to make it when you are checking out. We can accommodate you into the schedule based on your clinical situation, but not for extending refills beyond a year. Labs are important to give refills and it is important to get labs before the visit     Phone calls  -  Allow  24 hrs. for non-urgent calls to be returned  Prior authorization - It may take 2 to 4 weeks to process  Forms  -  FMLA, DMV etc., will take up to 2 weeks to process  Cancellations - please notify the 
ICU

## 2024-10-28 NOTE — PROGRESS NOTES
Apryl Jeronimo is a 54 y.o. male here for   Chief Complaint   Patient presents with    Diabetes       1. Have you been to the ER, urgent care clinic since your last visit?  Hospitalized since your last visit? -NO    2. Have you seen or consulted any other health care providers outside of the [facility] since your last visit?  Include any pap smears or colon screening.-NO

## 2024-10-30 PROBLEM — H25.10 NUCLEAR SENILE CATARACT: Status: ACTIVE | Noted: 2020-08-05

## 2024-10-30 PROBLEM — D86.89 GRANULOMA OF LIVER ASSOCIATED WITH SARCOIDOSIS: Status: ACTIVE | Noted: 2022-05-02

## 2024-10-30 PROBLEM — H53.452 LOSS OF PERIPHERAL VISUAL FIELD, LEFT: Status: ACTIVE | Noted: 2021-02-18

## 2024-10-30 PROBLEM — H00.19 CHALAZION: Status: ACTIVE | Noted: 2021-01-08

## 2024-10-30 PROBLEM — R59.0 MEDIASTINAL LYMPHADENOPATHY: Status: ACTIVE | Noted: 2024-10-30

## 2024-10-30 PROBLEM — K76.0 NAFLD (NONALCOHOLIC FATTY LIVER DISEASE): Status: ACTIVE | Noted: 2022-07-07

## 2024-10-30 PROBLEM — H34.00 TRANSIENT ARTERIAL RETINAL OCCLUSION: Status: ACTIVE | Noted: 2021-02-18

## 2024-10-30 PROBLEM — K74.02 ADVANCED HEPATIC FIBROSIS: Status: ACTIVE | Noted: 2022-07-07

## 2025-01-06 RX ORDER — SEMAGLUTIDE 1.34 MG/ML
INJECTION, SOLUTION SUBCUTANEOUS
Qty: 9 ML | Refills: 3 | Status: SHIPPED | OUTPATIENT
Start: 2025-01-06

## 2025-03-17 ENCOUNTER — OFFICE VISIT (OUTPATIENT)
Age: 55
End: 2025-03-17
Payer: COMMERCIAL

## 2025-03-17 VITALS
TEMPERATURE: 97.5 F | HEIGHT: 72 IN | DIASTOLIC BLOOD PRESSURE: 70 MMHG | WEIGHT: 184.6 LBS | OXYGEN SATURATION: 96 % | SYSTOLIC BLOOD PRESSURE: 118 MMHG | BODY MASS INDEX: 25 KG/M2 | HEART RATE: 79 BPM | RESPIRATION RATE: 16 BRPM

## 2025-03-17 DIAGNOSIS — E11.29 TYPE 2 DIABETES MELLITUS WITH OTHER DIABETIC KIDNEY COMPLICATION: ICD-10-CM

## 2025-03-17 DIAGNOSIS — E78.2 MIXED HYPERLIPIDEMIA: ICD-10-CM

## 2025-03-17 DIAGNOSIS — E06.3 HYPOTHYROIDISM DUE TO HASHIMOTO'S THYROIDITIS: ICD-10-CM

## 2025-03-17 DIAGNOSIS — D86.9 SARCOIDOSIS, UNSPECIFIED: ICD-10-CM

## 2025-03-17 DIAGNOSIS — K74.60 CIRRHOSIS OF LIVER WITHOUT ASCITES, UNSPECIFIED HEPATIC CIRRHOSIS TYPE (HCC): ICD-10-CM

## 2025-03-17 DIAGNOSIS — E11.29 TYPE 2 DIABETES MELLITUS WITH OTHER DIABETIC KIDNEY COMPLICATION: Primary | ICD-10-CM

## 2025-03-17 DIAGNOSIS — D69.6 THROMBOCYTOPENIA: ICD-10-CM

## 2025-03-17 PROCEDURE — 3051F HG A1C>EQUAL 7.0%<8.0%: CPT | Performed by: INTERNAL MEDICINE

## 2025-03-17 PROCEDURE — 99214 OFFICE O/P EST MOD 30 MIN: CPT | Performed by: INTERNAL MEDICINE

## 2025-03-17 RX ORDER — CYPROHEPTADINE HYDROCHLORIDE 4 MG/1
4 TABLET ORAL 3 TIMES DAILY PRN
COMMUNITY

## 2025-03-17 RX ORDER — INSULIN GLARGINE 100 [IU]/ML
INJECTION, SOLUTION SUBCUTANEOUS
Qty: 15 ML | Refills: 2
Start: 2025-03-17

## 2025-03-17 RX ORDER — ROSUVASTATIN CALCIUM 10 MG/1
10 TABLET, COATED ORAL NIGHTLY
Qty: 90 TABLET | Refills: 3 | Status: SHIPPED | OUTPATIENT
Start: 2025-03-17

## 2025-03-17 RX ORDER — HYDROCHLOROTHIAZIDE 12.5 MG/1
CAPSULE ORAL
Qty: 6 EACH | Refills: 3 | Status: SHIPPED | OUTPATIENT
Start: 2025-03-17

## 2025-03-17 NOTE — PROGRESS NOTES
Apryl Jeronimo is a 54 y.o. male here for No chief complaint on file.      1. Have you been to the ER, urgent care clinic since your last visit?  Hospitalized since your last visit? - No    2. Have you seen or consulted any other health care providers outside of the [facility] since your last visit?  Include any pap smears or colon screening.- 1/29/25 [facility] Dr. [facility]    
from    May 2024    compared to  6.1 %    from  August 2023    compared to    6.8 %   from April 2023      compared to   6.7 %   from    august 2022      Compared to  Over 12  %  From [facility]  April 29 2022  compared to    7 %   From sept 2021    Compared to  6.2 %     From    Today   May 2021    Compared   To  July  by POC is 6. 7% compared to 7 %  from January 2020 March 2025      As he could not  take  ozempic  @  0.5 mg  dose   for  N/ V , he wants to try Mounjaro   Increase to  750 mg bid  on metformin sr      If not covered , will stick to ozempic 0.25 mg a week    ( Lantus and novolog kept  on reserve if steroids begin )     Reviewed   Libreview AGP  report for 14 days and discussed with pt     - 14 day average glucose 187    - GMI   7.8  %   - 42 % for high ;  9  %  Very high   and 0 %  low sugars and % in Target  Range  49    %     - percent of utilization 72   %   - CV% 22.9  %          October 2024    Reviewed   on Ozempic , but he stays @ 0.5 mg dose only ( prescription is still kept at 1 mg a week)  On  metformin sr 750 mg  once  a day ( keep the same )  -  he has hypoglycemias  which make him have diarrhea   ( Lantus and novolog kept  on reserve if steroids begin )     Reviewed   Libreview AGP  report for 14 days and discussed with pt     - 14 day average glucose 160     - GMI   7.1  %   - 25 % for high ;  1 %  Very high   and 0 %  low sugars and % in Target  Range  74   %     - percent of utilization 97   %   - CV% 21 .1 %           2. Hypoglycemia :  Educated on treating the hypoglycemia.       3. HTN : Not on meds.       4.  Dyslipidemia : on crestor  And vascepa . Chl levels are up   Was considering  repatha q monthly ;  He had cardiac cath @ [facility]  and  he was found to have mild CAD          5. Hepatic and pulmonary sarcoidisis  : Follows  Dr. Levy , [place]  Pulmonologist , clear  PET ct scan       7.  Subclinical hypothyroidism on levothyroxine  @ 25 mcg a day      TSH at 5 from march 2025

## 2025-03-17 NOTE — PATIENT INSTRUCTIONS
Samples  - will only be dispensed at visits       If not showing up for the appointment and/ or  cancelling appointment within 24 hours are kept track of and three such situations in  two consecutive years will likely be considered for termination from the practice    -------------------------------------------------------------------------------------------------------------------

## 2025-03-18 LAB
ALBUMIN SERPL-MCNC: 4 G/DL (ref 3.8–4.9)
ALBUMIN/CREAT UR: <3 MG/G CREAT (ref 0–29)
ALP SERPL-CCNC: 121 IU/L (ref 44–121)
ALT SERPL-CCNC: 36 IU/L (ref 0–44)
ANA SER QL: NEGATIVE
AST SERPL-CCNC: 28 IU/L (ref 0–40)
BASOPHILS # BLD AUTO: 0 X10E3/UL (ref 0–0.2)
BASOPHILS NFR BLD AUTO: 0 %
BILIRUB SERPL-MCNC: 0.7 MG/DL (ref 0–1.2)
BUN SERPL-MCNC: 17 MG/DL (ref 6–24)
BUN/CREAT SERPL: 17 (ref 9–20)
CALCIUM SERPL-MCNC: 9 MG/DL (ref 8.7–10.2)
CHLORIDE SERPL-SCNC: 106 MMOL/L (ref 96–106)
CHOLEST SERPL-MCNC: 206 MG/DL (ref 100–199)
CO2 SERPL-SCNC: 22 MMOL/L (ref 20–29)
CREAT SERPL-MCNC: 1.02 MG/DL (ref 0.76–1.27)
CREAT UR-MCNC: 114.5 MG/DL
EGFRCR SERPLBLD CKD-EPI 2021: 86 ML/MIN/1.73
EOSINOPHIL # BLD AUTO: 0.1 X10E3/UL (ref 0–0.4)
EOSINOPHIL NFR BLD AUTO: 4 %
ERYTHROCYTE [DISTWIDTH] IN BLOOD BY AUTOMATED COUNT: 14.8 % (ref 11.6–15.4)
GLOBULIN SER CALC-MCNC: 3.2 G/DL (ref 1.5–4.5)
GLUCOSE SERPL-MCNC: 181 MG/DL (ref 70–99)
HBA1C MFR BLD: 7.5 % (ref 4.8–5.6)
HCT VFR BLD AUTO: 35.4 % (ref 37.5–51)
HDLC SERPL-MCNC: 35 MG/DL
HGB BLD-MCNC: 11.3 G/DL (ref 13–17.7)
IMM GRANULOCYTES # BLD AUTO: 0 X10E3/UL (ref 0–0.1)
IMM GRANULOCYTES NFR BLD AUTO: 0 %
IMP & REVIEW OF LAB RESULTS: NORMAL
LDLC SERPL CALC-MCNC: 136 MG/DL (ref 0–99)
LYMPHOCYTES # BLD AUTO: 0.5 X10E3/UL (ref 0.7–3.1)
LYMPHOCYTES NFR BLD AUTO: 20 %
Lab: NORMAL
MCH RBC QN AUTO: 26 PG (ref 26.6–33)
MCHC RBC AUTO-ENTMCNC: 31.9 G/DL (ref 31.5–35.7)
MCV RBC AUTO: 82 FL (ref 79–97)
MICROALBUMIN UR-MCNC: <3 UG/ML
MONOCYTES # BLD AUTO: 0.2 X10E3/UL (ref 0.1–0.9)
MONOCYTES NFR BLD AUTO: 8 %
MORPHOLOGY BLD-IMP: ABNORMAL
NEUTROPHILS # BLD AUTO: 1.5 X10E3/UL (ref 1.4–7)
NEUTROPHILS NFR BLD AUTO: 68 %
PLATELET # BLD AUTO: 77 X10E3/UL (ref 150–450)
POTASSIUM SERPL-SCNC: 4.3 MMOL/L (ref 3.5–5.2)
PROT SERPL-MCNC: 7.2 G/DL (ref 6–8.5)
RBC # BLD AUTO: 4.34 X10E6/UL (ref 4.14–5.8)
SODIUM SERPL-SCNC: 141 MMOL/L (ref 134–144)
T4 FREE SERPL-MCNC: 1.08 NG/DL (ref 0.82–1.77)
TRIGL SERPL-MCNC: 194 MG/DL (ref 0–149)
TSH SERPL DL<=0.005 MIU/L-ACNC: 5 UIU/ML (ref 0.45–4.5)
VLDLC SERPL CALC-MCNC: 35 MG/DL (ref 5–40)
WBC # BLD AUTO: 2.3 X10E3/UL (ref 3.4–10.8)

## 2025-03-19 DIAGNOSIS — E11.29 TYPE 2 DIABETES MELLITUS WITH OTHER DIABETIC KIDNEY COMPLICATION (HCC): ICD-10-CM

## 2025-03-19 RX ORDER — LIRAGLUTIDE 6 MG/ML
1.8 INJECTION SUBCUTANEOUS DAILY
Qty: 9 ML | Refills: 0 | OUTPATIENT
Start: 2025-03-19

## 2025-03-21 DIAGNOSIS — E11.29 TYPE 2 DIABETES MELLITUS WITH OTHER DIABETIC KIDNEY COMPLICATION (HCC): ICD-10-CM

## 2025-03-21 RX ORDER — LIRAGLUTIDE 6 MG/ML
INJECTION SUBCUTANEOUS
Refills: 0 | OUTPATIENT
Start: 2025-03-21

## 2025-03-24 RX ORDER — SEMAGLUTIDE 0.68 MG/ML
INJECTION, SOLUTION SUBCUTANEOUS
Refills: 0 | OUTPATIENT
Start: 2025-03-24

## 2025-04-28 ENCOUNTER — OFFICE VISIT (OUTPATIENT)
Age: 55
End: 2025-04-28
Payer: COMMERCIAL

## 2025-04-28 VITALS
HEART RATE: 73 BPM | SYSTOLIC BLOOD PRESSURE: 101 MMHG | HEIGHT: 72 IN | TEMPERATURE: 97.6 F | BODY MASS INDEX: 23.95 KG/M2 | WEIGHT: 176.8 LBS | DIASTOLIC BLOOD PRESSURE: 59 MMHG | OXYGEN SATURATION: 96 %

## 2025-04-28 DIAGNOSIS — E06.3 HYPOTHYROIDISM DUE TO HASHIMOTO'S THYROIDITIS: ICD-10-CM

## 2025-04-28 DIAGNOSIS — D86.9 SARCOIDOSIS, UNSPECIFIED: ICD-10-CM

## 2025-04-28 DIAGNOSIS — K74.60 CIRRHOSIS OF LIVER WITHOUT ASCITES, UNSPECIFIED HEPATIC CIRRHOSIS TYPE (HCC): ICD-10-CM

## 2025-04-28 DIAGNOSIS — E78.2 MIXED HYPERLIPIDEMIA: ICD-10-CM

## 2025-04-28 DIAGNOSIS — E11.29 TYPE 2 DIABETES MELLITUS WITH OTHER DIABETIC KIDNEY COMPLICATION (HCC): Primary | ICD-10-CM

## 2025-04-28 DIAGNOSIS — D69.6 THROMBOCYTOPENIA: ICD-10-CM

## 2025-04-28 PROCEDURE — 3051F HG A1C>EQUAL 7.0%<8.0%: CPT | Performed by: INTERNAL MEDICINE

## 2025-04-28 PROCEDURE — 99214 OFFICE O/P EST MOD 30 MIN: CPT | Performed by: INTERNAL MEDICINE

## 2025-04-28 NOTE — PROGRESS NOTES
[facility] DIABETES AND ENDOCRINOLOGY                Danni Kearns MD FACE      HISTORY OF PRESENT ILLNESS   Apryl Jeronimo is a 54   y.o.  male.      Follow up visit  after  Last  visit for diabetes 2 , hypothyroidism,  HPL , sarcoidosis, hypogonadism    from last visit   March 2025       He is tolerating Mounjaro well   He has pending  bone marrow biopsy pending  for pancytopenia        March 2025      He has stopped  ozempic a  month ago   He had severe N/ V  despite using the dose half of  1 mg    Gained 4 lbs     October 2024     Lost 1 lb   Pt is  tolerating  lesser doses of Ozempic because of contipation   Had a visit with [facility],   was suggested  to take coreg for   postal hypertension   He has not gotten the med yet  !   He  had MRI  of liver  at  [facility]       April 2024    He is looking healthy and happy   Recent diagnosis of lichen planus of skin  Could not tolerate 1 mg ozempic dose , so takes half the dial  He is using christian   Unable to handle sugars around 70 mg ( hopes to have better A1c )         August 2023        He had increased  Ozempic dose to 1 mg a week  roughly April 2023 , GI asked him to increase it   Suddenly, one day he felt severely nauseous and he threw up   Discussed with GI  and stopped ozempic   He has been off  of it  for 2 weeks, feeling whole lot better   Able to exercise   He is eager to resume ozempic too,  because his MRI liver showed no hepatic steatosis and he is thrilled with this change   Also, he is hoping to be off AZT soon   Asking if he will need jardiance   Right now taking only janumet xr ( he reported taking half pill of janumet xr )        March 2023    He is compliant with ozempic and janumet xr    He has not needed steroids    His PET CT came clean for  sarcoidosis     He feels healthy    He changed to  Christian 3      Initial visit history July 2020   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

## 2025-04-28 NOTE — PATIENT INSTRUCTIONS
SPECIFIC INSTRUCTIONS BELOW     Mounjaro 2.5 mg a week      Metformin sr  750  mg twice a day         Levothyroxine  25  mcg  A day, on empty stomach with water only, no other meds or food or drinks   For next half hour   Take any kind of vitamins, calcium, iron   Pills  4 hours later      -------------PAY ATTENTION TO THESE GENERAL INSTRUCTIONS -----------------      - The medications prescribed at this visit will not be available at pharmacy until 6 pm today        - your med list is not updated until the visit encounter is closed, so FOLLOW THE TYPED SPECIFIC INSTRUCTIONS  ABOVE     -ANY tests other than blood work, which you opt to do  outside the  [facility] facilities, you are responsible for prior authorizations if  required    - health maintenance will not be updated  on AVS, so please ignore it       Results     *Normal results will not be notified by a phone call starting January 1 2024   *If you have an upcoming visit, the results will be discussed at the visit   *Please sign up for MY CHART if you want access to your lab and test results  *Abnormal results which require immediate attention will be notified by phone call   *Abnormal results which do not require immediate assistance will be notified in 1-2 weeks       Refills    -    have your pharmacy send us a refill request . Refills are done max for one year and a visit is a must before refills are extended    Follow up appointments -  highly encourage you to make it when you are checking out. We can accommodate you into the schedule based on your clinical situation, but not for extending refills beyond a year. Labs are important to give refills and it is important to get labs before the visit     Phone calls  -  Allow  24 hrs. for non-urgent calls to be returned  Prior authorization - It may take 2 to 4 weeks to process  Forms  -  FMLA, DMV etc., will take up to 2 weeks to process  Cancellations - please notify the office 2 days in advance

## 2025-05-19 DIAGNOSIS — E11.29 TYPE 2 DIABETES MELLITUS WITH OTHER DIABETIC KIDNEY COMPLICATION (HCC): Primary | ICD-10-CM

## 2025-05-20 RX ORDER — METFORMIN HYDROCHLORIDE 750 MG/1
750 TABLET, EXTENDED RELEASE ORAL 2 TIMES DAILY WITH MEALS
Qty: 180 TABLET | Refills: 3 | Status: SHIPPED | OUTPATIENT
Start: 2025-05-20

## 2025-07-15 DIAGNOSIS — E11.29 TYPE 2 DIABETES MELLITUS WITH OTHER DIABETIC KIDNEY COMPLICATION (HCC): ICD-10-CM

## 2025-07-15 DIAGNOSIS — E78.2 MIXED HYPERLIPIDEMIA: ICD-10-CM

## 2025-07-15 RX ORDER — ICOSAPENT ETHYL 1 G/1
1 CAPSULE ORAL 2 TIMES DAILY WITH MEALS
Qty: 180 CAPSULE | Refills: 3 | Status: SHIPPED | OUTPATIENT
Start: 2025-07-15